# Patient Record
Sex: FEMALE | Race: WHITE | NOT HISPANIC OR LATINO | Employment: FULL TIME | URBAN - METROPOLITAN AREA
[De-identification: names, ages, dates, MRNs, and addresses within clinical notes are randomized per-mention and may not be internally consistent; named-entity substitution may affect disease eponyms.]

---

## 2023-04-25 ENCOUNTER — ANESTHESIA (OUTPATIENT)
Dept: GASTROENTEROLOGY | Facility: HOSPITAL | Age: 48
End: 2023-04-25

## 2023-04-25 ENCOUNTER — HOSPITAL ENCOUNTER (OUTPATIENT)
Dept: GASTROENTEROLOGY | Facility: HOSPITAL | Age: 48
Setting detail: OUTPATIENT SURGERY
Discharge: HOME | End: 2023-04-25
Attending: STUDENT IN AN ORGANIZED HEALTH CARE EDUCATION/TRAINING PROGRAM | Admitting: STUDENT IN AN ORGANIZED HEALTH CARE EDUCATION/TRAINING PROGRAM

## 2023-04-25 ENCOUNTER — ANESTHESIA EVENT (OUTPATIENT)
Dept: GASTROENTEROLOGY | Facility: HOSPITAL | Age: 48
End: 2023-04-25

## 2023-04-25 VITALS
SYSTOLIC BLOOD PRESSURE: 100 MMHG | BODY MASS INDEX: 23.37 KG/M2 | HEIGHT: 62 IN | OXYGEN SATURATION: 99 % | WEIGHT: 127 LBS | DIASTOLIC BLOOD PRESSURE: 56 MMHG | TEMPERATURE: 98 F | HEART RATE: 92 BPM | RESPIRATION RATE: 16 BRPM

## 2023-04-25 DIAGNOSIS — Z12.11 SCREENING FOR COLON CANCER: ICD-10-CM

## 2023-04-25 RX ORDER — PROPOFOL 10 MG/ML
INJECTION, EMULSION INTRAVENOUS CONTINUOUS PRN
Status: DISCONTINUED | OUTPATIENT
Start: 2023-04-25 | End: 2023-04-25

## 2023-04-25 RX ORDER — PROPOFOL 10 MG/ML
INJECTION, EMULSION INTRAVENOUS AS NEEDED
Status: DISCONTINUED | OUTPATIENT
Start: 2023-04-25 | End: 2023-04-25

## 2023-04-25 RX ORDER — SODIUM CHLORIDE, SODIUM LACTATE, POTASSIUM CHLORIDE, CALCIUM CHLORIDE 600; 310; 30; 20 MG/100ML; MG/100ML; MG/100ML; MG/100ML
125 INJECTION, SOLUTION INTRAVENOUS CONTINUOUS
Status: DISCONTINUED | OUTPATIENT
Start: 2023-04-25 | End: 2023-04-29 | Stop reason: HOSPADM

## 2023-04-25 RX ORDER — LIDOCAINE HYDROCHLORIDE 20 MG/ML
INJECTION, SOLUTION EPIDURAL; INFILTRATION; INTRACAUDAL; PERINEURAL AS NEEDED
Status: DISCONTINUED | OUTPATIENT
Start: 2023-04-25 | End: 2023-04-25

## 2023-04-25 RX ADMIN — PROPOFOL 30 MG: 10 INJECTION, EMULSION INTRAVENOUS at 12:55

## 2023-04-25 RX ADMIN — PROPOFOL 70 MG: 10 INJECTION, EMULSION INTRAVENOUS at 12:51

## 2023-04-25 RX ADMIN — PROPOFOL 30 MG: 10 INJECTION, EMULSION INTRAVENOUS at 12:52

## 2023-04-25 RX ADMIN — LIDOCAINE HYDROCHLORIDE 50 MG: 20 INJECTION, SOLUTION EPIDURAL; INFILTRATION; INTRACAUDAL; PERINEURAL at 12:51

## 2023-04-25 RX ADMIN — PROPOFOL 130 MCG/KG/MIN: 10 INJECTION, EMULSION INTRAVENOUS at 12:51

## 2023-04-25 RX ADMIN — PROPOFOL 30 MG: 10 INJECTION, EMULSION INTRAVENOUS at 12:58

## 2023-04-25 RX ADMIN — SODIUM CHLORIDE, SODIUM LACTATE, POTASSIUM CHLORIDE, AND CALCIUM CHLORIDE 125 ML/HR: .6; .31; .03; .02 INJECTION, SOLUTION INTRAVENOUS at 11:28

## 2023-04-25 NOTE — H&P
"History and Physical -  [facility]  Jean-Claude Rausch 41 y o  female MRN: [id number]                  HPI: Jean-Claude Rausch is a 41y o  year old female who presents for colon cancer screening      REVIEW OF SYSTEMS: Per the HPI, and otherwise unremarkable  Historical Information   Past Medical History:   Diagnosis Date   • Allergic    • Cancer ([address] ) 2018    breast masectomy     Past Surgical History:   Procedure Laterality Date   • BREAST SURGERY  2018   • HYSTERECTOMY       Social History   Social History     Substance and Sexual Activity   Alcohol Use Not Currently    Comment: occasional     Social History     Substance and Sexual Activity   Drug Use Never     Social History     Tobacco Use   Smoking Status Never   Smokeless Tobacco Never     Family History   Problem Relation Age of Onset   • No Known Problems Mother    • No Known Problems Father    • No Known Problems Brother        Meds/Allergies       Current Outpatient Medications:   •  cetirizine (ZyrTEC) 10 mg tablet  •  cholecalciferol (VITAMIN D3) 1,000 units tablet  •  lisinopril (ZESTRIL) 5 mg tablet  •  Multiple Vitamin (multivitamin) tablet  •  tamoxifen (NOLVADEX) 20 mg tablet  •  Omega-3 Fatty Acids (fish oil) 1,000 mg  •  polyethylene glycol (GOLYTELY) 4000 mL solution    Current Facility-Administered Medications:   •  lactated ringers infusion, 125 mL/hr, Intravenous, Continuous, Continue from Pre-op at 04/25/23 1210    No Known Allergies    Objective     /76   Pulse 95   Temp 98 9 °F (37 2 °C) (Temporal)   Resp 18   Ht 5' 2\" (1 575 m)   Wt 57 6 kg (127 lb)   SpO2 99%   BMI 23 23 kg/m²       PHYSICAL EXAM    Gen: NAD  Head: NCAT  CV: RRR  CHEST: Clear  ABD: soft, NT/ND  EXT: no edema      ASSESSMENT/PLAN:  This is a 41y o  year old female here for colonoscopy, and she is stable and optimized for her procedure          "

## 2023-04-25 NOTE — ANESTHESIA PREPROCEDURE EVALUATION
Procedure:  COLONOSCOPY    Relevant Problems   CARDIO   (+) Hypertension   (+) Mixed hyperlipidemia      GYN   (+) Breast cancer (HCC)   (+) S/P abdominal hysterectomy and left salpingo-oophorectomy        Physical Exam    Airway    Mallampati score: I  TM Distance: >3 FB  Neck ROM: full     Dental   No notable dental hx     Cardiovascular      Pulmonary      Other Findings        Anesthesia Plan  ASA Score- 2     Anesthesia Type- IV sedation with anesthesia with ASA Monitors  Additional Monitors:   Airway Plan:           Plan Factors-    Chart reviewed  Patient summary reviewed  Induction- intravenous  Postoperative Plan-     Informed Consent- Anesthetic plan and risks discussed with patient  I personally reviewed this patient with the CRNA  Discussed and agreed on the Anesthesia Plan with the CRNA  Dania Franklin

## 2023-04-25 NOTE — ANESTHESIA POSTPROCEDURE EVALUATION
Post-Op Assessment Note    CV Status:  Stable  Pain Score: 0    Pain management: adequate     Mental Status:  Arousable   Hydration Status:  Stable   PONV Controlled:  None   Airway Patency:  Patent      Post Op Vitals Reviewed: Yes      Staff: CRNA         No notable events documented      BP   94/52   Temp      Pulse  89   Resp 16   SpO2   98

## 2023-08-05 ENCOUNTER — APPOINTMENT (OUTPATIENT)
Dept: LAB | Facility: CLINIC | Age: 48
End: 2023-08-05
Payer: COMMERCIAL

## 2023-08-05 DIAGNOSIS — R76.8 FALSE POSITIVE SEROLOGICAL TEST FOR SYPHILIS: ICD-10-CM

## 2023-08-05 DIAGNOSIS — D70.8 OTHER NEUTROPENIA (HCC): ICD-10-CM

## 2023-08-05 DIAGNOSIS — D72.819 LEUKOPENIA, UNSPECIFIED TYPE: ICD-10-CM

## 2023-08-05 DIAGNOSIS — R74.8 ACID PHOSPHATASE ELEVATED: ICD-10-CM

## 2023-08-05 DIAGNOSIS — R63.5 ABNORMAL WEIGHT GAIN: ICD-10-CM

## 2023-08-05 DIAGNOSIS — E78.2 MIXED HYPERLIPIDEMIA: ICD-10-CM

## 2023-08-05 DIAGNOSIS — M35.00 SICCA SYNDROME (HCC): ICD-10-CM

## 2023-08-05 LAB
C3 SERPL-MCNC: 117 MG/DL (ref 90–180)
C4 SERPL-MCNC: 25 MG/DL (ref 10–40)
T4 FREE SERPL-MCNC: 0.74 NG/DL (ref 0.61–1.12)
TSH SERPL DL<=0.05 MIU/L-ACNC: 1.78 UIU/ML (ref 0.45–4.5)

## 2023-08-05 PROCEDURE — 86376 MICROSOMAL ANTIBODY EACH: CPT

## 2023-08-05 PROCEDURE — 36415 COLL VENOUS BLD VENIPUNCTURE: CPT

## 2023-08-05 PROCEDURE — 84443 ASSAY THYROID STIM HORMONE: CPT

## 2023-08-05 PROCEDURE — 86235 NUCLEAR ANTIGEN ANTIBODY: CPT

## 2023-08-05 PROCEDURE — 80074 ACUTE HEPATITIS PANEL: CPT

## 2023-08-05 PROCEDURE — 86160 COMPLEMENT ANTIGEN: CPT

## 2023-08-05 PROCEDURE — 86800 THYROGLOBULIN ANTIBODY: CPT

## 2023-08-05 PROCEDURE — 84439 ASSAY OF FREE THYROXINE: CPT

## 2023-08-05 PROCEDURE — 86225 DNA ANTIBODY NATIVE: CPT

## 2023-08-06 LAB
HAV IGM SER QL: NORMAL
HBV CORE IGM SER QL: NORMAL
HBV SURFACE AG SER QL: NORMAL
HCV AB SER QL: NORMAL

## 2023-08-07 LAB
DSDNA AB SER-ACNC: <1 IU/ML (ref 0–9)
ENA SS-A AB SER-ACNC: <0.2 AI (ref 0–0.9)
ENA SS-B AB SER-ACNC: <0.2 AI (ref 0–0.9)
THYROPEROXIDASE AB SERPL-ACNC: <9 IU/ML (ref 0–34)

## 2023-08-08 LAB — THYROGLOB AB SERPL-ACNC: <1 IU/ML (ref 0–0.9)

## 2023-08-10 PROCEDURE — 36415 COLL VENOUS BLD VENIPUNCTURE: CPT

## 2023-08-17 LAB — MISCELLANEOUS LAB TEST RESULT: NORMAL

## 2023-09-07 ENCOUNTER — TELEPHONE (OUTPATIENT)
Dept: INTERNAL MEDICINE CLINIC | Facility: OTHER | Age: 48
End: 2023-09-07

## 2023-09-07 NOTE — TELEPHONE ENCOUNTER
Called patient to schedule an appointment and she stated that the bloodwork in her chart from 8/5 was ordered by a Dr. Mohan Paz, a rheumatologist at [facility] in [facility]. She stated that she has an appointment on 9/19/23 with them to review all the results. Do you still need me to schedule and appointment with you and if so she would like to know what the appointment would be for?

## 2023-09-07 NOTE — TELEPHONE ENCOUNTER
----- Message from [address] sent at 9/1/2023  9:41 AM EDT -----  Please call to schedule follow up to discuss blood work results.    ----- Message -----  From: Lab, Background User  Sent: 8/30/2023   4:40 PM EDT  To: CHINTAN Vaughan

## 2023-09-08 NOTE — TELEPHONE ENCOUNTER
Her urine results were abnormal and her doctor had forwarded the results to me to discuss, she can wait till after her follow up with her rheumatologist if she would like.

## 2023-10-09 DIAGNOSIS — I10 HYPERTENSION, UNSPECIFIED TYPE: ICD-10-CM

## 2023-10-09 RX ORDER — LISINOPRIL 5 MG/1
5 TABLET ORAL DAILY
Qty: 90 TABLET | Refills: 1 | Status: SHIPPED | OUTPATIENT
Start: 2023-10-09

## 2024-01-03 ENCOUNTER — TELEPHONE (OUTPATIENT)
Age: 49
End: 2024-01-03

## 2024-01-03 DIAGNOSIS — E55.9 VITAMIN D DEFICIENCY: Primary | ICD-10-CM

## 2024-01-05 DIAGNOSIS — I10 HYPERTENSION, UNSPECIFIED TYPE: ICD-10-CM

## 2024-01-05 DIAGNOSIS — E78.2 MIXED HYPERLIPIDEMIA: Primary | ICD-10-CM

## 2024-01-27 ENCOUNTER — APPOINTMENT (OUTPATIENT)
Dept: LAB | Facility: CLINIC | Age: 49
End: 2024-01-27
Payer: COMMERCIAL

## 2024-01-27 DIAGNOSIS — E78.2 MIXED HYPERLIPIDEMIA: ICD-10-CM

## 2024-01-27 DIAGNOSIS — I10 HYPERTENSION, UNSPECIFIED TYPE: ICD-10-CM

## 2024-01-27 LAB
25(OH)D3 SERPL-MCNC: 32 NG/ML (ref 30–100)
ALBUMIN SERPL BCP-MCNC: 3.8 G/DL (ref 3.5–5)
ALP SERPL-CCNC: 31 U/L (ref 34–104)
ALT SERPL W P-5'-P-CCNC: 18 U/L (ref 7–52)
ANION GAP SERPL CALCULATED.3IONS-SCNC: 9 MMOL/L
AST SERPL W P-5'-P-CCNC: 20 U/L (ref 13–39)
BILIRUB SERPL-MCNC: 0.2 MG/DL (ref 0.2–1)
BUN SERPL-MCNC: 15 MG/DL (ref 5–25)
CALCIUM SERPL-MCNC: 9.2 MG/DL (ref 8.4–10.2)
CHLORIDE SERPL-SCNC: 104 MMOL/L (ref 96–108)
CO2 SERPL-SCNC: 26 MMOL/L (ref 21–32)
CREAT SERPL-MCNC: 0.31 MG/DL (ref 0.6–1.3)
GFR SERPL CREATININE-BSD FRML MDRD: 136 ML/MIN/1.73SQ M
GLUCOSE P FAST SERPL-MCNC: 87 MG/DL (ref 65–99)
POTASSIUM SERPL-SCNC: 4.3 MMOL/L (ref 3.5–5.3)
PROT SERPL-MCNC: 6.1 G/DL (ref 6.4–8.4)
SODIUM SERPL-SCNC: 139 MMOL/L (ref 135–147)

## 2024-01-29 ENCOUNTER — OFFICE VISIT (OUTPATIENT)
Age: 49
End: 2024-01-29
Payer: COMMERCIAL

## 2024-01-29 VITALS
TEMPERATURE: 98.4 F | HEART RATE: 74 BPM | SYSTOLIC BLOOD PRESSURE: 124 MMHG | WEIGHT: 153 LBS | HEIGHT: 62 IN | BODY MASS INDEX: 28.16 KG/M2 | OXYGEN SATURATION: 99 % | DIASTOLIC BLOOD PRESSURE: 80 MMHG

## 2024-01-29 DIAGNOSIS — D64.9 LOW HEMOGLOBIN: Primary | ICD-10-CM

## 2024-01-29 DIAGNOSIS — E55.9 VITAMIN D DEFICIENCY: ICD-10-CM

## 2024-01-29 DIAGNOSIS — I10 HYPERTENSION, UNSPECIFIED TYPE: ICD-10-CM

## 2024-01-29 DIAGNOSIS — Z23 ENCOUNTER FOR IMMUNIZATION: ICD-10-CM

## 2024-01-29 DIAGNOSIS — M35.00 SICCA COMPLEX (HCC): ICD-10-CM

## 2024-01-29 DIAGNOSIS — N87.9 CERVICAL DYSPLASIA: ICD-10-CM

## 2024-01-29 DIAGNOSIS — C50.912 MALIGNANT NEOPLASM OF LEFT FEMALE BREAST, UNSPECIFIED ESTROGEN RECEPTOR STATUS, UNSPECIFIED SITE OF BREAST (HCC): ICD-10-CM

## 2024-01-29 DIAGNOSIS — D70.8 OTHER NEUTROPENIA (HCC): ICD-10-CM

## 2024-01-29 DIAGNOSIS — E78.2 MIXED HYPERLIPIDEMIA: ICD-10-CM

## 2024-01-29 PROCEDURE — 90715 TDAP VACCINE 7 YRS/> IM: CPT

## 2024-01-29 PROCEDURE — 99214 OFFICE O/P EST MOD 30 MIN: CPT | Performed by: NURSE PRACTITIONER

## 2024-01-29 PROCEDURE — 90471 IMMUNIZATION ADMIN: CPT

## 2024-01-29 RX ORDER — LISINOPRIL 5 MG/1
5 TABLET ORAL DAILY
Qty: 90 TABLET | Refills: 1 | Status: SHIPPED | OUTPATIENT
Start: 2024-01-29

## 2024-01-29 NOTE — ASSESSMENT & PLAN NOTE
Well controlled on lisinopril. Continue current regimen -   Continue to monitor blood pressure at home.  Goal BP is < 130/80.  Contact our office for consistent elevations.  Recommend low sodium diet.  Exercise 30 minutes 5 times a week as tolerated.  Recommend yearly eye exam.

## 2024-01-29 NOTE — PROGRESS NOTES
Assessment/Plan:    Hypertension  Well controlled on lisinopril. Continue current regimen -   Continue to monitor blood pressure at home.  Goal BP is < 130/80.  Contact our office for consistent elevations.  Recommend low sodium diet.  Exercise 30 minutes 5 times a week as tolerated.  Recommend yearly eye exam.       Cervical dysplasia  Status post hysterectomy    Vitamin D deficiency  Continue with supplementation.     Other neutropenia (HCC)  Continue to monitor CBC   Continue to follow with hematology     Mixed hyperlipidemia  Currently taking fish oil. Recommend healthy lifestyle choices for your cholesterol.  Low fat/low cholesterol diet.  Limit/avoid red meat.  Eat more lean meat - chicken breast, ground turkey, fish.  Exercise 30 mins at least 5 times a week as tolerated.        Breast cancer (HCC)  Currently taking Tamoxifen, status post bilateral mastectomy    Low hemoglobin  -repeat CBC panel   -get iron and B12 levels              Diagnoses and all orders for this visit:    Low hemoglobin  -     CBC and differential  -     Iron Panel (Includes Ferritin, Iron Sat%, Iron, and TIBC); Future  -     Vitamin B12; Future    Hypertension, unspecified type  -     lisinopril (ZESTRIL) 5 mg tablet; Take 1 tablet (5 mg total) by mouth daily  -     Comprehensive metabolic panel; Future  -     CBC and differential; Future  -     Lipid panel; Future    Sicca complex (HCC)    Malignant neoplasm of left female breast, unspecified estrogen receptor status, unspecified site of breast (HCC)    Vitamin D deficiency  -     Vitamin D 25 hydroxy; Future    Cervical dysplasia    Other neutropenia (HCC)    Mixed hyperlipidemia          Subjective:      Patient ID: Rosalba Vazquez is a 48 y.o. female.    Patient presents today for annual follow-up.  Reviewed blood work while in office today.    Has gained weight, but has not been eating as well     HTN-well controlled on lisinopril, denies side effects with this medication      Cervical dysplasia-history of abdominal hysterectomy and left salpingo-oophorectomy       Breast CA- history of bilateral mastectomy, taking tamoxifen    Mixed hyperlipidemia- currently taking fish oil, well controlled, cholesterol 201 LDL 81    Other neutropenia- chronic for patient, patient reports that in the past she has been noted to have low white blood cell count, and has seen Hematology in the past    Denies any new concerns.      Hbg 10.9  Has had blood in her stool, once every couple of months, colonoscopy 2023     Saw Rheumatology   +YORDAN, dry eyes with negative Sjogren's antibodies and mild leukopenia:  All other labs negative / normal  - no evidence to support diagnosis of systemic autoimmune condition     She reports some seasonal depression- she works from home  and dose not get out much, she defers medication, also offered talk therapy             The following portions of the patient's history were reviewed and updated as appropriate: allergies, current medications, past family history, past medical history, past social history, past surgical history, and problem list.    Review of Systems   Constitutional:  Negative for activity change, appetite change, chills, diaphoresis and fever.   HENT:  Negative for congestion, ear discharge, ear pain, postnasal drip, rhinorrhea, sinus pressure, sinus pain and sore throat.    Eyes:  Negative for pain, discharge, itching and visual disturbance.   Respiratory:  Negative for cough, chest tightness, shortness of breath and wheezing.    Cardiovascular:  Negative for chest pain, palpitations and leg swelling.   Gastrointestinal:  Negative for abdominal pain, constipation, diarrhea, nausea and vomiting.   Endocrine: Negative for polydipsia, polyphagia and polyuria.   Genitourinary:  Negative for difficulty urinating, dysuria and urgency.   Musculoskeletal:  Negative for arthralgias, back pain and neck pain.   Skin:  Negative for rash and wound.   Neurological:   "Negative for dizziness, weakness, numbness and headaches.   Psychiatric/Behavioral:  Positive for dysphoric mood.          Past Medical History:   Diagnosis Date    Allergic     Cancer (HCC) 2018    breast masectomy         Current Outpatient Medications:     cetirizine (ZyrTEC) 10 mg tablet, Take 10 mg by mouth daily, Disp: , Rfl:     cholecalciferol (VITAMIN D3) 1,000 units tablet, Take 1,000 Units by mouth daily, Disp: , Rfl:     lisinopril (ZESTRIL) 5 mg tablet, Take 1 tablet (5 mg total) by mouth daily, Disp: 90 tablet, Rfl: 1    Multiple Vitamin (multivitamin) tablet, Take 1 tablet by mouth daily, Disp: , Rfl:     Omega-3 Fatty Acids (fish oil) 1,000 mg, Take 1,000 mg by mouth daily, Disp: , Rfl:     tamoxifen (NOLVADEX) 20 mg tablet, Take 20 mg by mouth daily, Disp: , Rfl:     No Known Allergies    Social History   Past Surgical History:   Procedure Laterality Date    BREAST SURGERY  2018    HYSTERECTOMY       Family History   Problem Relation Age of Onset    No Known Problems Mother     No Known Problems Father     No Known Problems Brother        Objective:  /80 (BP Location: Right arm, Patient Position: Sitting, Cuff Size: Standard)   Pulse 74   Temp 98.4 °F (36.9 °C) (Temporal)   Ht 5' 2.05\" (1.576 m)   Wt 69.4 kg (153 lb)   SpO2 99%   BMI 27.94 kg/m²     Recent Results (from the past 1344 hour(s))   CBC and differential    Collection Time: 01/27/24  8:00 AM   Result Value Ref Range    WBC 4.35 4.31 - 10.16 Thousand/uL    RBC 3.42 (L) 3.81 - 5.12 Million/uL    Hemoglobin 10.9 (L) 11.5 - 15.4 g/dL    Hematocrit 33.7 (L) 34.8 - 46.1 %    MCV 99 (H) 82 - 98 fL    MCH 31.9 26.8 - 34.3 pg    MCHC 32.3 31.4 - 37.4 g/dL    RDW 12.5 11.6 - 15.1 %    MPV 8.6 (L) 8.9 - 12.7 fL    Platelets 250 149 - 390 Thousands/uL    nRBC 0 /100 WBCs    Neutrophils Relative 46 43 - 75 %    Immat GRANS % 0 0 - 2 %    Lymphocytes Relative 36 14 - 44 %    Monocytes Relative 15 (H) 4 - 12 %    Eosinophils Relative 2 0 - 6 " %    Basophils Relative 1 0 - 1 %    Neutrophils Absolute 2.00 1.85 - 7.62 Thousands/µL    Immature Grans Absolute 0.01 0.00 - 0.20 Thousand/uL    Lymphocytes Absolute 1.58 0.60 - 4.47 Thousands/µL    Monocytes Absolute 0.66 0.17 - 1.22 Thousand/µL    Eosinophils Absolute 0.08 0.00 - 0.61 Thousand/µL    Basophils Absolute 0.02 0.00 - 0.10 Thousands/µL   Lipid panel    Collection Time: 01/27/24  8:00 AM   Result Value Ref Range    Cholesterol 201 (H) See Comment mg/dL    Triglycerides 83 See Comment mg/dL    HDL, Direct 103 >=50 mg/dL    LDL Calculated 81 0 - 100 mg/dL    Non-HDL-Chol (CHOL-HDL) 98 mg/dl   Comprehensive metabolic panel    Collection Time: 01/27/24  8:00 AM   Result Value Ref Range    Sodium 139 135 - 147 mmol/L    Potassium 4.3 3.5 - 5.3 mmol/L    Chloride 104 96 - 108 mmol/L    CO2 26 21 - 32 mmol/L    ANION GAP 9 mmol/L    BUN 15 5 - 25 mg/dL    Creatinine 0.31 (L) 0.60 - 1.30 mg/dL    Glucose, Fasting 87 65 - 99 mg/dL    Calcium 9.2 8.4 - 10.2 mg/dL    AST 20 13 - 39 U/L    ALT 18 7 - 52 U/L    Alkaline Phosphatase 31 (L) 34 - 104 U/L    Total Protein 6.1 (L) 6.4 - 8.4 g/dL    Albumin 3.8 3.5 - 5.0 g/dL    Total Bilirubin 0.20 0.20 - 1.00 mg/dL    eGFR 136 ml/min/1.73sq m   Vitamin D 25 hydroxy    Collection Time: 01/27/24  8:00 AM   Result Value Ref Range    Vit D, 25-Hydroxy 32.0 30.0 - 100.0 ng/mL            Physical Exam  Constitutional:       General: She is not in acute distress.     Appearance: She is well-developed. She is not diaphoretic.   HENT:      Head: Normocephalic and atraumatic.      Right Ear: External ear normal.      Left Ear: External ear normal.      Nose: Nose normal.      Mouth/Throat:      Mouth: Mucous membranes are moist.      Pharynx: No oropharyngeal exudate or posterior oropharyngeal erythema.   Eyes:      General:         Right eye: No discharge.         Left eye: No discharge.      Conjunctiva/sclera: Conjunctivae normal.      Pupils: Pupils are equal, round, and  reactive to light.   Neck:      Thyroid: No thyromegaly.   Cardiovascular:      Rate and Rhythm: Normal rate and regular rhythm.      Heart sounds: Normal heart sounds. No murmur heard.     No friction rub. No gallop.   Pulmonary:      Effort: Pulmonary effort is normal. No respiratory distress.      Breath sounds: Normal breath sounds. No stridor. No wheezing or rales.   Abdominal:      General: Bowel sounds are normal. There is no distension.      Palpations: Abdomen is soft.      Tenderness: There is no abdominal tenderness.   Musculoskeletal:      Cervical back: Normal range of motion and neck supple.   Lymphadenopathy:      Cervical: No cervical adenopathy.   Skin:     General: Skin is warm and dry.      Findings: No erythema or rash.   Neurological:      Mental Status: She is alert and oriented to person, place, and time.   Psychiatric:         Behavior: Behavior normal.         Thought Content: Thought content normal.         Judgment: Judgment normal.

## 2024-01-29 NOTE — ASSESSMENT & PLAN NOTE
Currently taking fish oil. Recommend healthy lifestyle choices for your cholesterol.  Low fat/low cholesterol diet.  Limit/avoid red meat.  Eat more lean meat - chicken breast, ground turkey, fish.  Exercise 30 mins at least 5 times a week as tolerated.

## 2024-05-13 ENCOUNTER — HOSPITAL ENCOUNTER (EMERGENCY)
Facility: HOSPITAL | Age: 49
Discharge: HOME | End: 2024-05-13
Attending: EMERGENCY MEDICINE
Payer: COMMERCIAL

## 2024-05-13 ENCOUNTER — APPOINTMENT (EMERGENCY)
Dept: CT IMAGING | Facility: HOSPITAL | Age: 49
End: 2024-05-13
Payer: COMMERCIAL

## 2024-05-13 ENCOUNTER — APPOINTMENT (EMERGENCY)
Dept: ULTRASOUND IMAGING | Facility: HOSPITAL | Age: 49
End: 2024-05-13
Payer: COMMERCIAL

## 2024-05-13 VITALS
HEIGHT: 62 IN | SYSTOLIC BLOOD PRESSURE: 131 MMHG | HEART RATE: 100 BPM | BODY MASS INDEX: 28.16 KG/M2 | DIASTOLIC BLOOD PRESSURE: 71 MMHG | RESPIRATION RATE: 18 BRPM | WEIGHT: 153 LBS | OXYGEN SATURATION: 99 %

## 2024-05-13 DIAGNOSIS — N83.201 CYST OF RIGHT OVARY: Primary | ICD-10-CM

## 2024-05-13 LAB
ALBUMIN SERPL BCP-MCNC: 4.2 G/DL (ref 3.5–5)
ALP SERPL-CCNC: 22 U/L (ref 34–104)
ALT SERPL W P-5'-P-CCNC: 20 U/L (ref 7–52)
ANION GAP SERPL CALCULATED.3IONS-SCNC: 7 MMOL/L (ref 4–13)
APTT PPP: 24 SECONDS (ref 23–37)
AST SERPL W P-5'-P-CCNC: 26 U/L (ref 13–39)
BASOPHILS # BLD AUTO: 0.04 THOUSANDS/ÂΜL (ref 0–0.1)
BASOPHILS NFR BLD AUTO: 1 % (ref 0–1)
BILIRUB SERPL-MCNC: 0.36 MG/DL (ref 0.2–1)
BILIRUB UR QL STRIP: NEGATIVE
BUN SERPL-MCNC: 13 MG/DL (ref 5–25)
CALCIUM SERPL-MCNC: 9.3 MG/DL (ref 8.4–10.2)
CHLORIDE SERPL-SCNC: 107 MMOL/L (ref 96–108)
CLARITY UR: CLEAR
CO2 SERPL-SCNC: 24 MMOL/L (ref 21–32)
COLOR UR: YELLOW
CREAT SERPL-MCNC: 0.41 MG/DL (ref 0.6–1.3)
EOSINOPHIL # BLD AUTO: 0.03 THOUSAND/ÂΜL (ref 0–0.61)
EOSINOPHIL NFR BLD AUTO: 1 % (ref 0–6)
ERYTHROCYTE [DISTWIDTH] IN BLOOD BY AUTOMATED COUNT: 12.7 % (ref 11.6–15.1)
GFR SERPL CREATININE-BSD FRML MDRD: 124 ML/MIN/1.73SQ M
GLUCOSE SERPL-MCNC: 115 MG/DL (ref 65–140)
GLUCOSE UR STRIP-MCNC: NEGATIVE MG/DL
HCT VFR BLD AUTO: 37.8 % (ref 34.8–46.1)
HGB BLD-MCNC: 12.7 G/DL (ref 11.5–15.4)
HGB UR QL STRIP.AUTO: NEGATIVE
IMM GRANULOCYTES # BLD AUTO: 0.05 THOUSAND/UL (ref 0–0.2)
IMM GRANULOCYTES NFR BLD AUTO: 1 % (ref 0–2)
INR PPP: 0.9 (ref 0.84–1.19)
KETONES UR STRIP-MCNC: NEGATIVE MG/DL
LEUKOCYTE ESTERASE UR QL STRIP: NEGATIVE
LYMPHOCYTES # BLD AUTO: 0.97 THOUSANDS/ÂΜL (ref 0.6–4.47)
LYMPHOCYTES NFR BLD AUTO: 16 % (ref 14–44)
MCH RBC QN AUTO: 32.7 PG (ref 26.8–34.3)
MCHC RBC AUTO-ENTMCNC: 33.6 G/DL (ref 31.4–37.4)
MCV RBC AUTO: 97 FL (ref 82–98)
MONOCYTES # BLD AUTO: 0.42 THOUSAND/ÂΜL (ref 0.17–1.22)
MONOCYTES NFR BLD AUTO: 7 % (ref 4–12)
NEUTROPHILS # BLD AUTO: 4.63 THOUSANDS/ÂΜL (ref 1.85–7.62)
NEUTS SEG NFR BLD AUTO: 74 % (ref 43–75)
NITRITE UR QL STRIP: NEGATIVE
NRBC BLD AUTO-RTO: 0 /100 WBCS
PH UR STRIP.AUTO: 6 [PH]
PLATELET # BLD AUTO: 266 THOUSANDS/UL (ref 149–390)
PMV BLD AUTO: 8.8 FL (ref 8.9–12.7)
POTASSIUM SERPL-SCNC: 4.7 MMOL/L (ref 3.5–5.3)
PROT SERPL-MCNC: 7 G/DL (ref 6.4–8.4)
PROT UR STRIP-MCNC: NEGATIVE MG/DL
PROTHROMBIN TIME: 12.3 SECONDS (ref 11.6–14.5)
RBC # BLD AUTO: 3.88 MILLION/UL (ref 3.81–5.12)
SODIUM SERPL-SCNC: 138 MMOL/L (ref 135–147)
SP GR UR STRIP.AUTO: >=1.03
UROBILINOGEN UR QL STRIP.AUTO: 0.2 E.U./DL
WBC # BLD AUTO: 6.14 THOUSAND/UL (ref 4.31–10.16)

## 2024-05-13 PROCEDURE — 85025 COMPLETE CBC W/AUTO DIFF WBC: CPT | Performed by: EMERGENCY MEDICINE

## 2024-05-13 PROCEDURE — 36415 COLL VENOUS BLD VENIPUNCTURE: CPT | Performed by: EMERGENCY MEDICINE

## 2024-05-13 PROCEDURE — 74177 CT ABD & PELVIS W/CONTRAST: CPT

## 2024-05-13 PROCEDURE — 96360 HYDRATION IV INFUSION INIT: CPT

## 2024-05-13 PROCEDURE — 99283 EMERGENCY DEPT VISIT LOW MDM: CPT

## 2024-05-13 PROCEDURE — 81003 URINALYSIS AUTO W/O SCOPE: CPT | Performed by: EMERGENCY MEDICINE

## 2024-05-13 PROCEDURE — 76856 US EXAM PELVIC COMPLETE: CPT

## 2024-05-13 PROCEDURE — 85610 PROTHROMBIN TIME: CPT | Performed by: EMERGENCY MEDICINE

## 2024-05-13 PROCEDURE — 99285 EMERGENCY DEPT VISIT HI MDM: CPT | Performed by: EMERGENCY MEDICINE

## 2024-05-13 PROCEDURE — 80053 COMPREHEN METABOLIC PANEL: CPT | Performed by: EMERGENCY MEDICINE

## 2024-05-13 PROCEDURE — 85730 THROMBOPLASTIN TIME PARTIAL: CPT | Performed by: EMERGENCY MEDICINE

## 2024-05-13 RX ADMIN — SODIUM CHLORIDE 1000 ML: 0.9 INJECTION, SOLUTION INTRAVENOUS at 10:57

## 2024-05-13 RX ADMIN — IOHEXOL 100 ML: 350 INJECTION, SOLUTION INTRAVENOUS at 11:49

## 2024-05-13 NOTE — DISCHARGE INSTRUCTIONS
It is important that you follow-up with your gynecologist oncologist in regards to the following ultrasound findings:    1.  No sonographic evidence of current ovarian torsion.  2.  Unilocular right ovarian cystic lesion with a solid component. Category O-RADS 4. Imaging recommendations include MRI (with MRI O-RADS) or GYN-oncologist protocol. Clinical management by gynecologist with GYN-oncologist consultation or solely by   GYN-oncologist.     It is important they return to the emergency department if your symptoms acutely worsen as this could be signs of an ovarian torsion you are more at risk for given the cystic mass

## 2024-05-13 NOTE — ED PROVIDER NOTES
History  Chief Complaint   Patient presents with    Flank Pain     Right flank pain since 7am today. No history of stones. Points to RLQ but mentions lower back pain as well.     Patient is a 48-year-old female presents for evaluation of right lower quadrant/right lower back pain.  Patient says that the symptoms started around 7 AM acutely.  She says she took 2 Advil which helped with the discomfort.  She says that the pain is in the right lower quadrant mostly right lower back.  Denies any urinary symptoms.  Denies any fevers or chills.  Does admit to nausea but no vomiting.  Does have a history of an ovarian cyst on that right side.  Has history of a hysterectomy and left cell Pingel oophorectomy.  He says that pain is worse with movement and standing.        Prior to Admission Medications   Prescriptions Last Dose Informant Patient Reported? Taking?   Multiple Vitamin (multivitamin) tablet  Self Yes No   Sig: Take 1 tablet by mouth daily   Omega-3 Fatty Acids (fish oil) 1,000 mg  Self Yes No   Sig: Take 1,000 mg by mouth daily   cetirizine (ZyrTEC) 10 mg tablet  Self Yes No   Sig: Take 10 mg by mouth daily   cholecalciferol (VITAMIN D3) 1,000 units tablet  Self Yes No   Sig: Take 1,000 Units by mouth daily   lisinopril (ZESTRIL) 5 mg tablet   No No   Sig: Take 1 tablet (5 mg total) by mouth daily   tamoxifen (NOLVADEX) 20 mg tablet  Self Yes No   Sig: Take 20 mg by mouth daily      Facility-Administered Medications: None       Past Medical History:   Diagnosis Date    Allergic     Cancer (HCC) 2018    breast masectomy       Past Surgical History:   Procedure Laterality Date    BREAST SURGERY  2018    HYSTERECTOMY         Family History   Problem Relation Age of Onset    No Known Problems Mother     No Known Problems Father     No Known Problems Brother      I have reviewed and agree with the history as documented.    E-Cigarette/Vaping    E-Cigarette Use Never User      E-Cigarette/Vaping Substances    Nicotine  No     THC No     CBD No     Flavoring No     Other No     Unknown No      Social History     Tobacco Use    Smoking status: Never    Smokeless tobacco: Never   Vaping Use    Vaping status: Never Used   Substance Use Topics    Alcohol use: Not Currently     Comment: occasional    Drug use: Never       Review of Systems   Constitutional:  Negative for chills, diaphoresis and fever.   HENT:  Negative for congestion, sinus pressure, sore throat and trouble swallowing.    Eyes:  Negative for pain, discharge and itching.   Respiratory:  Negative for cough, chest tightness, shortness of breath and wheezing.    Cardiovascular:  Negative for chest pain, palpitations and leg swelling.   Gastrointestinal:  Positive for abdominal pain (RLQ) and nausea. Negative for abdominal distention, blood in stool, diarrhea and vomiting.   Endocrine: Negative for polyphagia and polyuria.   Genitourinary:  Negative for difficulty urinating, dysuria, flank pain, hematuria, pelvic pain, vaginal bleeding, vaginal discharge and vaginal pain.   Musculoskeletal:  Negative for arthralgias and back pain.   Skin:  Negative for rash.   Neurological:  Negative for dizziness, syncope, weakness, light-headedness and headaches.       Physical Exam  Physical Exam  Vitals and nursing note reviewed.   Constitutional:       General: She is not in acute distress.     Appearance: She is well-developed.   HENT:      Head: Normocephalic and atraumatic.      Right Ear: External ear normal.      Left Ear: External ear normal.      Nose: Nose normal.      Mouth/Throat:      Mouth: Mucous membranes are moist.      Pharynx: No oropharyngeal exudate.   Eyes:      Conjunctiva/sclera: Conjunctivae normal.      Pupils: Pupils are equal, round, and reactive to light.   Cardiovascular:      Rate and Rhythm: Normal rate and regular rhythm.      Heart sounds: Normal heart sounds. No murmur heard.     No friction rub. No gallop.   Pulmonary:      Effort: Pulmonary effort is  normal. No respiratory distress.      Breath sounds: Normal breath sounds. No wheezing or rales.   Abdominal:      General: There is no distension.      Palpations: Abdomen is soft.      Tenderness: There is abdominal tenderness (RLQ). There is no guarding.   Musculoskeletal:         General: No swelling, tenderness or deformity. Normal range of motion.      Cervical back: Normal range of motion and neck supple.   Lymphadenopathy:      Cervical: No cervical adenopathy.   Skin:     General: Skin is warm and dry.   Neurological:      General: No focal deficit present.      Mental Status: She is alert and oriented to person, place, and time. Mental status is at baseline.      Cranial Nerves: No cranial nerve deficit.      Sensory: No sensory deficit.      Motor: No weakness or abnormal muscle tone.      Coordination: Coordination normal.         Vital Signs  ED Triage Vitals [05/13/24 1049]   Temp Pulse Respirations Blood Pressure SpO2   -- (!) 112 16 153/85 99 %      Temp src Heart Rate Source Patient Position - Orthostatic VS BP Location FiO2 (%)   -- Monitor Sitting Right arm --      Pain Score       6           Vitals:    05/13/24 1049 05/13/24 1506   BP: 153/85 131/71   Pulse: (!) 112 100   Patient Position - Orthostatic VS: Sitting Sitting         Visual Acuity      ED Medications  Medications   sodium chloride 0.9 % bolus 1,000 mL (0 mL Intravenous Stopped 5/13/24 1215)   iohexol (OMNIPAQUE) 350 MG/ML injection (MULTI-DOSE) 100 mL (100 mL Intravenous Given 5/13/24 1149)       Diagnostic Studies  Results Reviewed       Procedure Component Value Units Date/Time    Protime-INR [[id number]]  (Normal) Collected: 05/13/24 1056    Lab Status: Final result Specimen: Blood from Arm, Right Updated: 05/13/24 1128     Protime 12.3 seconds      INR 0.90    APTT [[id number]]  (Normal) Collected: 05/13/24 1056    Lab Status: Final result Specimen: Blood from Arm, Right Updated: 05/13/24 1128     PTT 24 seconds     Comprehensive  metabolic panel [[id number]]  (Abnormal) Collected: 05/13/24 1056    Lab Status: Final result Specimen: Blood from Arm, Right Updated: 05/13/24 1127     Sodium 138 mmol/L      Potassium 4.7 mmol/L      Chloride 107 mmol/L      CO2 24 mmol/L      ANION GAP 7 mmol/L      BUN 13 mg/dL      Creatinine 0.41 mg/dL      Glucose 115 mg/dL      Calcium 9.3 mg/dL      AST 26 U/L      ALT 20 U/L      Alkaline Phosphatase 22 U/L      Total Protein 7.0 g/dL      Albumin 4.2 g/dL      Total Bilirubin 0.36 mg/dL      eGFR 124 ml/min/1.73sq m     Narrative:      National Kidney Disease Foundation guidelines for Chronic Kidney Disease (CKD):     Stage 1 with normal or high GFR (GFR > 90 mL/min/1.73 square meters)    Stage 2 Mild CKD (GFR = 60-89 mL/min/1.73 square meters)    Stage 3A Moderate CKD (GFR = 45-59 mL/min/1.73 square meters)    Stage 3B Moderate CKD (GFR = 30-44 mL/min/1.73 square meters)    Stage 4 Severe CKD (GFR = 15-29 mL/min/1.73 square meters)    Stage 5 End Stage CKD (GFR <15 mL/min/1.73 square meters)  Note: GFR calculation is accurate only with a steady state creatinine    UA (URINE) with reflex to Scope [[id number]]  (Abnormal) Collected: 05/13/24 1104    Lab Status: Final result Specimen: Urine, Clean Catch Updated: 05/13/24 1117     Color, UA Yellow     Clarity, UA Clear     Specific Gravity, UA >=1.030     pH, UA 6.0     Leukocytes, UA Negative     Nitrite, UA Negative     Protein, UA Negative mg/dl      Glucose, UA Negative mg/dl      Ketones, UA Negative mg/dl      Urobilinogen, UA 0.2 E.U./dl      Bilirubin, UA Negative     Occult Blood, UA Negative    CBC and differential [[id number]]  (Abnormal) Collected: 05/13/24 1056    Lab Status: Final result Specimen: Blood from Arm, Right Updated: 05/13/24 1108     WBC 6.14 Thousand/uL      RBC 3.88 Million/uL      Hemoglobin 12.7 g/dL      Hematocrit 37.8 %      MCV 97 fL      MCH 32.7 pg      MCHC 33.6 g/dL      RDW 12.7 %      MPV 8.8 fL      Platelets 266  Thousands/uL      nRBC 0 /100 WBCs      Segmented % 74 %      Immature Grans % 1 %      Lymphocytes % 16 %      Monocytes % 7 %      Eosinophils Relative 1 %      Basophils Relative 1 %      Absolute Neutrophils 4.63 Thousands/µL      Absolute Immature Grans 0.05 Thousand/uL      Absolute Lymphocytes 0.97 Thousands/µL      Absolute Monocytes 0.42 Thousand/µL      Eosinophils Absolute 0.03 Thousand/µL      Basophils Absolute 0.04 Thousands/µL                    US pelvis complete non OB   Final Result by Cristiano Tijerina MD (05/13 7336)      1.  No sonographic evidence of current ovarian torsion.   2.  Unilocular right ovarian cystic lesion with a solid component. Category O-RADS 4. Imaging recommendations include MRI (with MRI O-RADS) or GYN-oncologist protocol. Clinical management by gynecologist with GYN-oncologist consultation or solely by    GYN-oncologist.      The study was marked in EPIC for immediate notification.         Workstation performed: [id number]         CT abdomen pelvis with contrast   Final Result by Randall Preciado MD (05/13 9248)      There is a left pelvic mass worrisome for right ovarian torsion. Would recommend a dedicated pelvic ultrasound for further evaluation purposes.      The study was marked in EPIC for immediate notification.         Workstation performed: [id number]                    Procedures  Procedures         ED Course  ED Course as of 05/14/24 2136   Mon May 13, 2024   1058 Patient had near total hysterectomy, urine pregnancy not needed   1312 Spoke with patient about the CT abdomen findings.  Patient says that her pain is on a 2 or 3 out of 10 much better than earlier this morning.  Will order a pelvic ultrasound.   1407 Spoke with the reading room to have the ultrasound read as soon as possible   1508 Patient symptoms improved.  Spoke with Rachel Holguin of gyn onc, who said that patient is okay for outpatient follow-up.  I spoke with the patient and updated  her on the results.  She says that she has an appoint with her gynecologist oncologist on Wednesday at [facility].  Given strict return precautions                                SBIRT 20yo+      Flowsheet Row Most Recent Value   Initial Alcohol Screen: US AUDIT-C     1. How often do you have a drink containing alcohol? 0 Filed at: 05/13/2024 1049   2. How many drinks containing alcohol do you have on a typical day you are drinking?  0 Filed at: 05/13/2024 1049   3b. FEMALE Any Age, or MALE 65+: How often do you have 4 or more drinks on one occassion? 0 Filed at: 05/13/2024 1049   Audit-C Score 0 Filed at: 05/13/2024 1049   PATRICE: How many times in the past year have you...    Used an illegal drug or used a prescription medication for non-medical reasons? Never Filed at: 05/13/2024 1049                      Medical Decision Making  48-year-old female presenting for evaluation of right lower quadrant abdominal pain this a.m.  History of right-sided ovarian cyst  Differential includes appendicitis, diverticulitis, ovarian cyst rupture, ovarian torsion  Will obtain CBC, CMP, UA.  Will obtain CT abdomen pelvis  CT abdomen pelvis shows concern for right pelvic mass and possible ovarian torsion.  Patient's symptoms have improved.  Will obtain stat ultrasound of the pelvis.    Ultrasound does not show any signs of torsion but does show a cystic mass with some solid component.  Spoke with Joi montaño who recommend outpatient follow-up.  Patient is going to follow-up with her Joi montaño on Wednesday.  See ED course for further details    Problems Addressed:  Cyst of right ovary: acute illness or injury    Amount and/or Complexity of Data Reviewed  Labs: ordered.  Radiology: ordered.    Risk  Prescription drug management.             Disposition  Final diagnoses:   Cyst of right ovary     Time reflects when diagnosis was documented in both MDM as applicable and the Disposition within this note       Time User Action Codes  Description Comment    5/13/2024  3:09 PM Nikhil Thakkar Add [N83.201] Cyst of right ovary           ED Disposition       ED Disposition   Discharge    Condition   Stable    Date/Time   Mon May 13, 2024 2189    Comment   Rosalba Vazquez discharge to home/self care.                   Follow-up Information       Follow up With Specialties Details Why Contact Info Additional Information    your gyn-oncologist  Schedule an appointment as soon as possible for a visit  For follow up of symptoms      [facility] Emergency Department Emergency Medicine Go to  If symptoms worsen [address] [facility] [address] [phone] [facility] Emergency Department, [address], [place], [place] [address]            Discharge Medication List as of 5/13/2024  3:11 PM        CONTINUE these medications which have NOT CHANGED    Details   cetirizine (ZyrTEC) 10 mg tablet Take 10 mg by mouth daily, Historical Med      cholecalciferol (VITAMIN D3) 1,000 units tablet Take 1,000 Units by mouth daily, Historical Med      lisinopril (ZESTRIL) 5 mg tablet Take 1 tablet (5 mg total) by mouth daily, Starting Mon 1/29/2024, Normal      Multiple Vitamin (multivitamin) tablet Take 1 tablet by mouth daily, Historical Med      Omega-3 Fatty Acids (fish oil) 1,000 mg Take 1,000 mg by mouth daily, Historical Med      tamoxifen (NOLVADEX) 20 mg tablet Take 20 mg by mouth daily, Starting Thu 2/3/2022, Historical Med             No discharge procedures on file.    PDMP Review       None            ED Provider  Electronically Signed by             Nikhil Thakkar DO  05/14/24 8769

## 2024-07-13 ENCOUNTER — APPOINTMENT (OUTPATIENT)
Dept: LAB | Facility: CLINIC | Age: 49
End: 2024-07-13
Payer: COMMERCIAL

## 2024-07-13 DIAGNOSIS — C50.512 MALIGNANT NEOPLASM OF LOWER-OUTER QUADRANT OF LEFT FEMALE BREAST, UNSPECIFIED ESTROGEN RECEPTOR STATUS (HCC): ICD-10-CM

## 2024-07-13 LAB
ALBUMIN SERPL BCG-MCNC: 4.3 G/DL (ref 3.5–5)
ALP SERPL-CCNC: 36 U/L (ref 34–104)
ALT SERPL W P-5'-P-CCNC: 22 U/L (ref 7–52)
ANION GAP SERPL CALCULATED.3IONS-SCNC: 8 MMOL/L (ref 4–13)
AST SERPL W P-5'-P-CCNC: 27 U/L (ref 13–39)
BASOPHILS # BLD AUTO: 0.02 THOUSANDS/ÂΜL (ref 0–0.1)
BASOPHILS NFR BLD AUTO: 1 % (ref 0–1)
BILIRUB SERPL-MCNC: 0.28 MG/DL (ref 0.2–1)
BUN SERPL-MCNC: 15 MG/DL (ref 5–25)
CALCIUM SERPL-MCNC: 9.8 MG/DL (ref 8.4–10.2)
CHLORIDE SERPL-SCNC: 105 MMOL/L (ref 96–108)
CO2 SERPL-SCNC: 26 MMOL/L (ref 21–32)
CREAT SERPL-MCNC: 0.42 MG/DL (ref 0.6–1.3)
EOSINOPHIL # BLD AUTO: 0.08 THOUSAND/ÂΜL (ref 0–0.61)
EOSINOPHIL NFR BLD AUTO: 2 % (ref 0–6)
ERYTHROCYTE [DISTWIDTH] IN BLOOD BY AUTOMATED COUNT: 12.3 % (ref 11.6–15.1)
ESTRADIOL SERPL-MCNC: <15 PG/ML
FSH SERPL-ACNC: 22.6 MIU/ML
GFR SERPL CREATININE-BSD FRML MDRD: 123 ML/MIN/1.73SQ M
GLUCOSE SERPL-MCNC: 92 MG/DL (ref 65–140)
HCT VFR BLD AUTO: 37.5 % (ref 34.8–46.1)
HGB BLD-MCNC: 12.4 G/DL (ref 11.5–15.4)
IMM GRANULOCYTES # BLD AUTO: 0.01 THOUSAND/UL (ref 0–0.2)
IMM GRANULOCYTES NFR BLD AUTO: 0 % (ref 0–2)
LYMPHOCYTES # BLD AUTO: 1.44 THOUSANDS/ÂΜL (ref 0.6–4.47)
LYMPHOCYTES NFR BLD AUTO: 39 % (ref 14–44)
MCH RBC QN AUTO: 32.6 PG (ref 26.8–34.3)
MCHC RBC AUTO-ENTMCNC: 33.1 G/DL (ref 31.4–37.4)
MCV RBC AUTO: 99 FL (ref 82–98)
MONOCYTES # BLD AUTO: 0.41 THOUSAND/ÂΜL (ref 0.17–1.22)
MONOCYTES NFR BLD AUTO: 11 % (ref 4–12)
NEUTROPHILS # BLD AUTO: 1.74 THOUSANDS/ÂΜL (ref 1.85–7.62)
NEUTS SEG NFR BLD AUTO: 47 % (ref 43–75)
NRBC BLD AUTO-RTO: 0 /100 WBCS
PLATELET # BLD AUTO: 243 THOUSANDS/UL (ref 149–390)
PMV BLD AUTO: 8.9 FL (ref 8.9–12.7)
POTASSIUM SERPL-SCNC: 5.1 MMOL/L (ref 3.5–5.3)
PROT SERPL-MCNC: 6.8 G/DL (ref 6.4–8.4)
RBC # BLD AUTO: 3.8 MILLION/UL (ref 3.81–5.12)
SODIUM SERPL-SCNC: 139 MMOL/L (ref 135–147)
WBC # BLD AUTO: 3.7 THOUSAND/UL (ref 4.31–10.16)

## 2024-07-13 PROCEDURE — 80053 COMPREHEN METABOLIC PANEL: CPT

## 2024-07-13 PROCEDURE — 85025 COMPLETE CBC W/AUTO DIFF WBC: CPT

## 2024-07-13 PROCEDURE — 83001 ASSAY OF GONADOTROPIN (FSH): CPT

## 2024-07-13 PROCEDURE — 36415 COLL VENOUS BLD VENIPUNCTURE: CPT

## 2024-07-13 PROCEDURE — 82670 ASSAY OF TOTAL ESTRADIOL: CPT

## 2024-08-21 DIAGNOSIS — I10 HYPERTENSION, UNSPECIFIED TYPE: ICD-10-CM

## 2024-08-21 RX ORDER — LISINOPRIL 5 MG/1
5 TABLET ORAL DAILY
Qty: 100 TABLET | Refills: 1 | Status: SHIPPED | OUTPATIENT
Start: 2024-08-21

## 2024-08-21 NOTE — TELEPHONE ENCOUNTER
Reason for call:   [x] Refill   [] Prior Auth  [] Other:     Office:   [x] [facility]  Authorized By: CHINTAN Brambila  [] Specialty/Provider -     Medication: lisinopril (ZESTRIL) 5 mg tablet     Dose/Frequency: Take 1 tablet (5 mg total) by mouth daily,     Quantity:  90 tablet     Pharmacy: [id number] [place] [address] [address]     Does the patient have enough for 3 days?   [] Yes   [x] No - Send as HP to POD

## 2024-11-12 ENCOUNTER — OFFICE VISIT (OUTPATIENT)
Age: 49
End: 2024-11-12
Payer: COMMERCIAL

## 2024-11-12 VITALS
OXYGEN SATURATION: 99 % | WEIGHT: 161.4 LBS | HEIGHT: 62 IN | TEMPERATURE: 98.8 F | SYSTOLIC BLOOD PRESSURE: 140 MMHG | BODY MASS INDEX: 29.7 KG/M2 | HEART RATE: 89 BPM | DIASTOLIC BLOOD PRESSURE: 90 MMHG

## 2024-11-12 DIAGNOSIS — R79.89 ABNORMAL TSH: ICD-10-CM

## 2024-11-12 DIAGNOSIS — K92.1 BLOOD IN THE STOOL: ICD-10-CM

## 2024-11-12 DIAGNOSIS — K21.9 GASTROESOPHAGEAL REFLUX DISEASE WITHOUT ESOPHAGITIS: ICD-10-CM

## 2024-11-12 DIAGNOSIS — Z13.228 SCREENING FOR METABOLIC DISORDER: ICD-10-CM

## 2024-11-12 DIAGNOSIS — Z90.79 S/P ABDOMINAL HYSTERECTOMY AND LEFT SALPINGO-OOPHORECTOMY: Primary | ICD-10-CM

## 2024-11-12 DIAGNOSIS — D70.8 OTHER NEUTROPENIA (HCC): ICD-10-CM

## 2024-11-12 DIAGNOSIS — N87.9 CERVICAL DYSPLASIA: ICD-10-CM

## 2024-11-12 DIAGNOSIS — Z90.721 S/P ABDOMINAL HYSTERECTOMY AND LEFT SALPINGO-OOPHORECTOMY: Primary | ICD-10-CM

## 2024-11-12 DIAGNOSIS — I10 HYPERTENSION, UNSPECIFIED TYPE: ICD-10-CM

## 2024-11-12 DIAGNOSIS — C50.919 MALIGNANT NEOPLASM OF FEMALE BREAST, UNSPECIFIED ESTROGEN RECEPTOR STATUS, UNSPECIFIED LATERALITY, UNSPECIFIED SITE OF BREAST (HCC): ICD-10-CM

## 2024-11-12 DIAGNOSIS — E78.5 HYPERLIPIDEMIA, UNSPECIFIED HYPERLIPIDEMIA TYPE: ICD-10-CM

## 2024-11-12 DIAGNOSIS — Z90.710 S/P ABDOMINAL HYSTERECTOMY AND LEFT SALPINGO-OOPHORECTOMY: Primary | ICD-10-CM

## 2024-11-12 DIAGNOSIS — E78.2 MIXED HYPERLIPIDEMIA: ICD-10-CM

## 2024-11-12 DIAGNOSIS — R73.01 ELEVATED FASTING GLUCOSE: ICD-10-CM

## 2024-11-12 DIAGNOSIS — E03.9 HYPOTHYROIDISM, UNSPECIFIED TYPE: ICD-10-CM

## 2024-11-12 PROCEDURE — 99215 OFFICE O/P EST HI 40 MIN: CPT | Performed by: NURSE PRACTITIONER

## 2024-11-12 RX ORDER — EXEMESTANE 25 MG/1
25 TABLET ORAL DAILY
COMMUNITY
Start: 2024-10-30

## 2024-11-12 NOTE — PROGRESS NOTES
Ambulatory Visit  Name: Rosalba Vazquez      : 1975      MRN: [id number]  Encounter Provider: CHINTAN Brambila  Encounter Date: 2024   Encounter department: [facility] [place]    Assessment & Plan  S/P abdominal hysterectomy and left salpingo-oophorectomy         Malignant neoplasm of female breast, unspecified estrogen receptor status, unspecified laterality, unspecified site of breast (HCC)  -Status post bilateral mastectomy, continue to follow with hematology       Screening for metabolic disorder    Orders:    CBC and differential    Comprehensive metabolic panel; Future    Lipid panel    Gastroesophageal reflux disease without esophagitis  -Continue on omeprazole  - You may use OTC tums as needed.  Recommend small frequent meals throughout the day.  Avoid aggravating foods - spicy foods, acidic foods - such as tomato and citrus.  Avoid alcohol.  Do not lay down for at least 30 mins after eating.         Orders:    omeprazole (PriLOSEC) 20 mg delayed release capsule; Take 1 capsule (20 mg total) by mouth daily before breakfast    Elevated fasting glucose    Orders:    Hemoglobin A1C    Hyperlipidemia, unspecified hyperlipidemia type    Orders:    Lipid panel    Blood in the stool  -Will get blood work  -Will get Hemoccult  -Referral to GI    Orders:    Iron Panel (Includes Ferritin, Iron Sat%, Iron, and TIBC); Future    Vitamin B12; Future    Occult blood x 3, stool; Future    Ambulatory Referral to Gastroenterology; Future    Abnormal TSH    Orders:    TSH, 3rd generation with Free T4 reflex    Hypothyroidism, unspecified type    Orders:    TSH, 3rd generation with Free T4 reflex    Hypertension, unspecified type  Well controlled on lisinopril.   Mildly elevated while in the office today  Continue current regimen -   Continue to monitor blood pressure at home.  Goal BP is < 130/80.  Contact our office for consistent elevations.  Recommend low sodium  diet.  Exercise 30 minutes 5 times a week as tolerated.  Recommend yearly eye exam.            Cervical dysplasia  Status post hysterectomy  Following Gyn oncology         Other neutropenia (HCC)  Continue to monitor CBC   Continue to follow with hematology          Mixed hyperlipidemia  Currently taking fish oil. Recommend healthy lifestyle choices for your cholesterol.  Low fat/low cholesterol diet.  Limit/avoid red meat.  Eat more lean meat - chicken breast, ground turkey, fish.  Exercise 30 mins at least 5 times a week as tolerated.             BMI 29.0-29.9,adult  -Discussed dietary changes and increased exercise  -Will get fasting blood work and discuss potential weight loss medication            History of Present Illness     Patient presents today for follow-up.   Reviewed blood work while in office today.  Patients oncologist suggested that she see her PCP to discuss menopause and weight gain   I did advise her to follow-up with her gynecologist for menopause like symptoms    Has gained weight, but has not been eating as well     She reports that she has been having blood in her stools every other day for a couple of weeks   Denies contaption or diarrhea   Had colonsocpy last year   Denies history of hemorrhoids    She reports fatigue     She also reports heart burn, takes Prilosec     HTN-well controlled on lisinopril, denies side effects with this medication     Cervical dysplasia-history of abdominal hysterectomy and left salpingo-oophorectomy       Breast CA- history of bilateral mastectomy, taking tamoxifen  Currently taking exemestane (AROMASIN) 25 MG tablet      Mixed hyperlipidemia- currently taking fish oil, well controlled, cholesterol 201 LDL 81    Other neutropenia- chronic for patient, patient reports that in the past she has been noted to have low white blood cell count, and has seen Hematology in the past    Saw Rheumatology   +YORDAN, dry eyes with negative Sjogren's antibodies and mild  "leukopenia:  All other labs negative / normal  - no evidence to support diagnosis of systemic autoimmune condition     She reports some seasonal depression- she works from home  and dose not get out much, she defers medication, also offered talk therapy             History obtained from : patient  Review of Systems   Constitutional:  Positive for fatigue. Negative for activity change, appetite change, chills, diaphoresis and fever.   HENT:  Negative for congestion, ear discharge, ear pain, postnasal drip, rhinorrhea, sinus pressure, sinus pain and sore throat.    Eyes:  Negative for pain, discharge, itching and visual disturbance.   Respiratory:  Negative for cough, chest tightness, shortness of breath and wheezing.    Cardiovascular:  Negative for chest pain, palpitations and leg swelling.   Gastrointestinal:  Positive for blood in stool. Negative for abdominal pain, constipation, diarrhea, nausea and vomiting.   Endocrine: Negative for polydipsia, polyphagia and polyuria.   Genitourinary:  Negative for difficulty urinating, dysuria and urgency.   Musculoskeletal:  Negative for arthralgias, back pain and neck pain.   Skin:  Negative for rash and wound.   Neurological:  Negative for dizziness, weakness, numbness and headaches.           Objective     /90 (BP Location: Left arm, Patient Position: Sitting, Cuff Size: Standard)   Pulse 89   Temp 98.8 °F (37.1 °C) (Temporal)   Ht 5' 1.93\" (1.573 m)   Wt 73.2 kg (161 lb 6.4 oz)   SpO2 99%   BMI 29.59 kg/m²     Physical Exam  Constitutional:       General: She is not in acute distress.     Appearance: She is well-developed. She is not diaphoretic.   HENT:      Head: Normocephalic and atraumatic.      Right Ear: External ear normal.      Left Ear: External ear normal.      Nose: Nose normal.      Mouth/Throat:      Mouth: Mucous membranes are moist.      Pharynx: No oropharyngeal exudate or posterior oropharyngeal erythema.   Eyes:      General:         Right " eye: No discharge.         Left eye: No discharge.      Conjunctiva/sclera: Conjunctivae normal.      Pupils: Pupils are equal, round, and reactive to light.   Neck:      Thyroid: No thyromegaly.   Cardiovascular:      Rate and Rhythm: Normal rate and regular rhythm.      Heart sounds: Normal heart sounds. No murmur heard.     No friction rub. No gallop.   Pulmonary:      Effort: Pulmonary effort is normal. No respiratory distress.      Breath sounds: Normal breath sounds. No stridor. No wheezing or rales.   Abdominal:      General: Bowel sounds are normal. There is no distension.      Palpations: Abdomen is soft.      Tenderness: There is no abdominal tenderness.   Musculoskeletal:      Cervical back: Normal range of motion and neck supple.   Lymphadenopathy:      Cervical: No cervical adenopathy.   Skin:     General: Skin is warm and dry.      Findings: No erythema or rash.   Neurological:      Mental Status: She is alert and oriented to person, place, and time.   Psychiatric:         Behavior: Behavior normal.         Thought Content: Thought content normal.         Judgment: Judgment normal.

## 2024-11-13 PROBLEM — K21.9 GASTROESOPHAGEAL REFLUX DISEASE WITHOUT ESOPHAGITIS: Status: ACTIVE | Noted: 2024-11-13

## 2024-11-13 PROBLEM — K92.1 BLOOD IN THE STOOL: Status: ACTIVE | Noted: 2024-11-13

## 2024-11-13 NOTE — ASSESSMENT & PLAN NOTE
-Continue on omeprazole  - You may use OTC tums as needed.  Recommend small frequent meals throughout the day.  Avoid aggravating foods - spicy foods, acidic foods - such as tomato and citrus.  Avoid alcohol.  Do not lay down for at least 30 mins after eating.         Orders:    omeprazole (PriLOSEC) 20 mg delayed release capsule; Take 1 capsule (20 mg total) by mouth daily before breakfast

## 2024-11-13 NOTE — ASSESSMENT & PLAN NOTE
-Will get blood work  -Will get Hemoccult  -Referral to GI    Orders:    Iron Panel (Includes Ferritin, Iron Sat%, Iron, and TIBC); Future    Vitamin B12; Future    Occult blood x 3, stool; Future    Ambulatory Referral to Gastroenterology; Future

## 2024-11-13 NOTE — ASSESSMENT & PLAN NOTE
Well controlled on lisinopril.   Mildly elevated while in the office today  Continue current regimen -   Continue to monitor blood pressure at home.  Goal BP is < 130/80.  Contact our office for consistent elevations.  Recommend low sodium diet.  Exercise 30 minutes 5 times a week as tolerated.  Recommend yearly eye exam.

## 2024-11-13 NOTE — ASSESSMENT & PLAN NOTE
-Discussed dietary changes and increased exercise  -Will get fasting blood work and discuss potential weight loss medication

## 2024-11-14 ENCOUNTER — APPOINTMENT (OUTPATIENT)
Dept: LAB | Facility: CLINIC | Age: 49
End: 2024-11-14
Payer: COMMERCIAL

## 2024-11-14 ENCOUNTER — RESULTS FOLLOW-UP (OUTPATIENT)
Dept: INTERNAL MEDICINE CLINIC | Age: 49
End: 2024-11-14

## 2024-11-14 DIAGNOSIS — Z13.228 SCREENING FOR METABOLIC DISORDER: ICD-10-CM

## 2024-11-14 DIAGNOSIS — R19.5 POSITIVE FIT (FECAL IMMUNOCHEMICAL TEST): Primary | ICD-10-CM

## 2024-11-14 DIAGNOSIS — K92.1 BLOOD IN THE STOOL: ICD-10-CM

## 2024-11-14 DIAGNOSIS — D64.9 LOW HEMOGLOBIN: ICD-10-CM

## 2024-11-14 LAB
ALBUMIN SERPL BCG-MCNC: 4.1 G/DL (ref 3.5–5)
ALP SERPL-CCNC: 50 U/L (ref 34–104)
ALT SERPL W P-5'-P-CCNC: 34 U/L (ref 7–52)
ANION GAP SERPL CALCULATED.3IONS-SCNC: 7 MMOL/L (ref 4–13)
AST SERPL W P-5'-P-CCNC: 30 U/L (ref 13–39)
BASOPHILS # BLD AUTO: 0.03 THOUSANDS/ÂΜL (ref 0–0.1)
BASOPHILS NFR BLD AUTO: 1 % (ref 0–1)
BILIRUB SERPL-MCNC: 0.26 MG/DL (ref 0.2–1)
BUN SERPL-MCNC: 17 MG/DL (ref 5–25)
CALCIUM SERPL-MCNC: 9.4 MG/DL (ref 8.4–10.2)
CHLORIDE SERPL-SCNC: 105 MMOL/L (ref 96–108)
CHOLEST SERPL-MCNC: 237 MG/DL (ref ?–200)
CO2 SERPL-SCNC: 28 MMOL/L (ref 21–32)
CREAT SERPL-MCNC: 0.43 MG/DL (ref 0.6–1.3)
EOSINOPHIL # BLD AUTO: 0.14 THOUSAND/ÂΜL (ref 0–0.61)
EOSINOPHIL NFR BLD AUTO: 4 % (ref 0–6)
ERYTHROCYTE [DISTWIDTH] IN BLOOD BY AUTOMATED COUNT: 12.7 % (ref 11.6–15.1)
EST. AVERAGE GLUCOSE BLD GHB EST-MCNC: 117 MG/DL
FERRITIN SERPL-MCNC: 22 NG/ML (ref 11–307)
GFR SERPL CREATININE-BSD FRML MDRD: 121 ML/MIN/1.73SQ M
GLUCOSE P FAST SERPL-MCNC: 95 MG/DL (ref 65–99)
HBA1C MFR BLD: 5.7 %
HCT VFR BLD AUTO: 36.5 % (ref 34.8–46.1)
HDLC SERPL-MCNC: 109 MG/DL
HEMOCCULT STL QL IA: POSITIVE
HGB BLD-MCNC: 11.9 G/DL (ref 11.5–15.4)
IMM GRANULOCYTES # BLD AUTO: 0.02 THOUSAND/UL (ref 0–0.2)
IMM GRANULOCYTES NFR BLD AUTO: 1 % (ref 0–2)
IRON SATN MFR SERPL: 30 % (ref 15–50)
IRON SERPL-MCNC: 136 UG/DL (ref 50–212)
LDLC SERPL CALC-MCNC: 113 MG/DL (ref 0–100)
LYMPHOCYTES # BLD AUTO: 1.18 THOUSANDS/ÂΜL (ref 0.6–4.47)
LYMPHOCYTES NFR BLD AUTO: 32 % (ref 14–44)
MCH RBC QN AUTO: 32.1 PG (ref 26.8–34.3)
MCHC RBC AUTO-ENTMCNC: 32.6 G/DL (ref 31.4–37.4)
MCV RBC AUTO: 98 FL (ref 82–98)
MONOCYTES # BLD AUTO: 0.48 THOUSAND/ÂΜL (ref 0.17–1.22)
MONOCYTES NFR BLD AUTO: 13 % (ref 4–12)
NEUTROPHILS # BLD AUTO: 1.87 THOUSANDS/ÂΜL (ref 1.85–7.62)
NEUTS SEG NFR BLD AUTO: 49 % (ref 43–75)
NONHDLC SERPL-MCNC: 128 MG/DL
NRBC BLD AUTO-RTO: 0 /100 WBCS
PLATELET # BLD AUTO: 254 THOUSANDS/UL (ref 149–390)
PMV BLD AUTO: 8.6 FL (ref 8.9–12.7)
POTASSIUM SERPL-SCNC: 4.4 MMOL/L (ref 3.5–5.3)
PROT SERPL-MCNC: 6.8 G/DL (ref 6.4–8.4)
RBC # BLD AUTO: 3.71 MILLION/UL (ref 3.81–5.12)
SODIUM SERPL-SCNC: 140 MMOL/L (ref 135–147)
TIBC SERPL-MCNC: 455 UG/DL (ref 250–450)
TRIGL SERPL-MCNC: 74 MG/DL (ref ?–150)
TSH SERPL DL<=0.05 MIU/L-ACNC: 2.57 UIU/ML (ref 0.45–4.5)
UIBC SERPL-MCNC: 319 UG/DL (ref 155–355)
VIT B12 SERPL-MCNC: 1207 PG/ML (ref 180–914)
WBC # BLD AUTO: 3.72 THOUSAND/UL (ref 4.31–10.16)

## 2024-11-14 PROCEDURE — 80053 COMPREHEN METABOLIC PANEL: CPT

## 2024-11-14 PROCEDURE — 83550 IRON BINDING TEST: CPT

## 2024-11-14 PROCEDURE — 82728 ASSAY OF FERRITIN: CPT

## 2024-11-14 PROCEDURE — 82607 VITAMIN B-12: CPT

## 2024-11-14 PROCEDURE — 83036 HEMOGLOBIN GLYCOSYLATED A1C: CPT | Performed by: NURSE PRACTITIONER

## 2024-11-14 PROCEDURE — G0328 FECAL BLOOD SCRN IMMUNOASSAY: HCPCS

## 2024-11-14 PROCEDURE — 84443 ASSAY THYROID STIM HORMONE: CPT | Performed by: NURSE PRACTITIONER

## 2024-11-14 PROCEDURE — 80061 LIPID PANEL: CPT | Performed by: NURSE PRACTITIONER

## 2024-11-14 PROCEDURE — 36415 COLL VENOUS BLD VENIPUNCTURE: CPT

## 2024-11-14 PROCEDURE — 83540 ASSAY OF IRON: CPT

## 2024-11-25 ENCOUNTER — CONSULT (OUTPATIENT)
Age: 49
End: 2024-11-25
Payer: COMMERCIAL

## 2024-11-25 VITALS
HEIGHT: 62 IN | OXYGEN SATURATION: 98 % | TEMPERATURE: 98.7 F | HEART RATE: 88 BPM | WEIGHT: 158.6 LBS | BODY MASS INDEX: 29.19 KG/M2

## 2024-11-25 DIAGNOSIS — R19.8 STRAINING DURING BOWEL MOVEMENTS: Primary | ICD-10-CM

## 2024-11-25 DIAGNOSIS — K92.1 BLOOD IN THE STOOL: ICD-10-CM

## 2024-11-25 PROCEDURE — 99214 OFFICE O/P EST MOD 30 MIN: CPT | Performed by: INTERNAL MEDICINE

## 2024-11-25 RX ORDER — POLYETHYLENE GLYCOL 3350, SODIUM SULFATE ANHYDROUS, SODIUM BICARBONATE, SODIUM CHLORIDE, POTASSIUM CHLORIDE 236; 22.74; 6.74; 5.86; 2.97 G/4L; G/4L; G/4L; G/4L; G/4L
4000 POWDER, FOR SOLUTION ORAL ONCE
Qty: 4000 ML | Refills: 0 | Status: SHIPPED | OUTPATIENT
Start: 2024-11-25 | End: 2024-11-25

## 2024-11-25 RX ORDER — SODIUM CHLORIDE, SODIUM LACTATE, POTASSIUM CHLORIDE, CALCIUM CHLORIDE 600; 310; 30; 20 MG/100ML; MG/100ML; MG/100ML; MG/100ML
125 INJECTION, SOLUTION INTRAVENOUS CONTINUOUS
OUTPATIENT
Start: 2024-11-25

## 2024-11-25 NOTE — PROGRESS NOTES
Name: Rosalba Vazquez      : 1975      MRN: [id number]  Encounter Provider: Jasmin Lay DO  Encounter Date: 2024   Encounter department: [facility]  :  Assessment & Plan  Blood in the stool  Patient is presenting with positive fit test, last colonoscopy in  did not show any abnormalities. However due to history of breast cancer in 2018 as well as new onset blood in stool patient would benefit from a repeat colonoscopy to rule out any concerns of malignancy.  Orders:    Ambulatory Referral to Gastroenterology    Colonoscopy; Future    polyethylene glycol (Golytely) 4000 mL solution; Take 4,000 mL by mouth once for 1 dose Take 4000 mL by mouth once for 1 dose. Use as directed    Straining during bowel movements             History of Present Illness     HPI  Rosalba Vazquez is a 49 y.o. female who has a past medical history of breast cancer in , hysterectomy in May of this year presents blood in stool.  Patient started noticing bright red blood per rectum in September of this year it has been ongoing since then it occurs usually daily noticeable with wiping as well as in the toilet bowl.  Patient denies any unintentional weight loss or any abdominal discomfort. She does endorse some straining with bowel movements which has been a chronic issue however for noticing it more post hysterectomy in May of this year.  Patient went to her PCP and had a fit test done which was positive.    Colonoscopy was in  which did not show any abnormalities at that time.      Review of Systems, per HPI  Past Medical History   Past Medical History:   Diagnosis Date    Allergic     Cancer (HCC) 2018    breast masectomy     Past Surgical History:   Procedure Laterality Date    BREAST SURGERY  2018    HYSTERECTOMY       Family History   Problem Relation Age of Onset    No Known Problems Mother     No Known Problems Father     No Known Problems Brother   "     reports that she has never smoked. She has never used smokeless tobacco. She reports that she does not currently use alcohol. She reports that she does not use drugs.  Current Outpatient Medications on File Prior to Visit   Medication Sig Dispense Refill    cetirizine (ZyrTEC) 10 mg tablet Take 10 mg by mouth daily      cholecalciferol (VITAMIN D3) 1,000 units tablet Take 1,000 Units by mouth daily      Cyanocobalamin (VITAMIN B 12 PO) Take by mouth in the morning B12 5000 mg,B6 3400 mg,B1 2400 mg,niacin and folate      exemestane (AROMASIN) 25 MG tablet Take 25 mg by mouth daily      lisinopril (ZESTRIL) 5 mg tablet Take 1 tablet (5 mg total) by mouth daily 100 tablet 1    Multiple Vitamin (multivitamin) tablet Take 1 tablet by mouth daily      Omega-3 Fatty Acids (fish oil) 1,000 mg Take 1,000 mg by mouth daily      omeprazole (PriLOSEC) 20 mg delayed release capsule Take 1 capsule (20 mg total) by mouth daily before breakfast 90 capsule 3    tamoxifen (NOLVADEX) 20 mg tablet Take 20 mg by mouth daily       No current facility-administered medications on file prior to visit.   No Known Allergies   Pertinent Medical History   Breast cancer 2018  Hysterectomy May 2024          Objective   Pulse 88   Temp 98.7 °F (37.1 °C) (Temporal)   Ht 5' 1.93\" (1.573 m)   Wt 71.9 kg (158 lb 9.6 oz)   SpO2 98%   BMI 29.07 kg/m²      Physical Exam  Vitals reviewed.   Pulmonary:      Effort: Pulmonary effort is normal.   Abdominal:      General: Abdomen is flat.   Neurological:      Mental Status: She is alert and oriented to person, place, and time.   Psychiatric:         Mood and Affect: Mood normal.         Alondra Owens MD  Gastroenterology Fellow  [facility]  Division of Gastroenterology and Hepatology    "

## 2024-11-25 NOTE — ASSESSMENT & PLAN NOTE
Patient is presenting with positive fit test, last colonoscopy in 2023 did not show any abnormalities. However due to history of breast cancer in 2018 as well as new onset blood in stool patient would benefit from a repeat colonoscopy to rule out any concerns of malignancy.  Orders:    Ambulatory Referral to Gastroenterology    Colonoscopy; Future    polyethylene glycol (Golytely) 4000 mL solution; Take 4,000 mL by mouth once for 1 dose Take 4000 mL by mouth once for 1 dose. Use as directed

## 2024-12-03 ENCOUNTER — TELEPHONE (OUTPATIENT)
Age: 49
End: 2024-12-03

## 2024-12-03 NOTE — TELEPHONE ENCOUNTER
Patients GI provider:  Dr. Lay    Number to return call: [phone]    Reason for call: Pt called in regard to scheduled colonoscopy. Pt stated the reason for the office visit has now resolved. Pt is asking if she still needs to proceed with the colonoscopy.     Scheduled procedure/appointment date if applicable: 12/11/2024

## 2024-12-06 ENCOUNTER — OFFICE VISIT (OUTPATIENT)
Age: 49
End: 2024-12-06
Payer: COMMERCIAL

## 2024-12-06 VITALS
OXYGEN SATURATION: 99 % | WEIGHT: 161 LBS | DIASTOLIC BLOOD PRESSURE: 82 MMHG | HEART RATE: 94 BPM | TEMPERATURE: 98.5 F | HEIGHT: 62 IN | SYSTOLIC BLOOD PRESSURE: 130 MMHG | BODY MASS INDEX: 29.63 KG/M2

## 2024-12-06 DIAGNOSIS — C50.919 MALIGNANT NEOPLASM OF FEMALE BREAST, UNSPECIFIED ESTROGEN RECEPTOR STATUS, UNSPECIFIED LATERALITY, UNSPECIFIED SITE OF BREAST (HCC): ICD-10-CM

## 2024-12-06 DIAGNOSIS — E78.2 MIXED HYPERLIPIDEMIA: ICD-10-CM

## 2024-12-06 DIAGNOSIS — Z78.0 POST-MENOPAUSAL: Primary | ICD-10-CM

## 2024-12-06 DIAGNOSIS — R73.03 PREDIABETES: ICD-10-CM

## 2024-12-06 DIAGNOSIS — E55.9 VITAMIN D DEFICIENCY: ICD-10-CM

## 2024-12-06 DIAGNOSIS — I10 HYPERTENSION, UNSPECIFIED TYPE: ICD-10-CM

## 2024-12-06 DIAGNOSIS — K92.1 BLOOD IN THE STOOL: ICD-10-CM

## 2024-12-06 DIAGNOSIS — D64.9 LOW HEMOGLOBIN: ICD-10-CM

## 2024-12-06 PROCEDURE — 99214 OFFICE O/P EST MOD 30 MIN: CPT | Performed by: NURSE PRACTITIONER

## 2024-12-06 RX ORDER — GABAPENTIN 100 MG/1
100 CAPSULE ORAL
Qty: 90 CAPSULE | Refills: 0 | Status: SHIPPED | OUTPATIENT
Start: 2024-12-06

## 2024-12-06 NOTE — ASSESSMENT & PLAN NOTE
-Discussed dietary changes and increased exercise  -Will start wegovy     Orders:    Semaglutide-Weight Management (WEGOVY) 0.25 MG/0.5ML; Inject 0.5 mL (0.25 mg total) under the skin once a week for 28 days

## 2024-12-06 NOTE — ASSESSMENT & PLAN NOTE
Patient is to continue to work on diet and exercise.  Limit sugars and carbohydrate intake.    Avoid soda, juice, sweets, cookies, desserts, pasta, bread.   Eat more whole grains, exercised 30 min of cardio at least 3 times a week.  Also recommended daily foot exams to check for sores, and recommended yearly eye exams.     -start wegovy    Orders:    Semaglutide-Weight Management (WEGOVY) 0.25 MG/0.5ML; Inject 0.5 mL (0.25 mg total) under the skin once a week for 28 days

## 2024-12-06 NOTE — PROGRESS NOTES
Name: Rosalba Vazquez      : 1975      MRN: [id number]  Encounter Provider: CHINTAN Brambila  Encounter Date: 2024   [facility]  :  Assessment & Plan  Post-menopausal    Orders:    gabapentin (NEURONTIN) 100 mg capsule; Take 1 capsule (100 mg total) by mouth daily at bedtime    BMI 29.0-29.9,adult  -Discussed dietary changes and increased exercise  -Will start wegovy     Orders:    Semaglutide-Weight Management (WEGOVY) 0.25 MG/0.5ML; Inject 0.5 mL (0.25 mg total) under the skin once a week for 28 days      Prediabetes  Patient is to continue to work on diet and exercise.  Limit sugars and carbohydrate intake.    Avoid soda, juice, sweets, cookies, desserts, pasta, bread.   Eat more whole grains, exercised 30 min of cardio at least 3 times a week.  Also recommended daily foot exams to check for sores, and recommended yearly eye exams.     -start wegovy    Orders:    Semaglutide-Weight Management (WEGOVY) 0.25 MG/0.5ML; Inject 0.5 mL (0.25 mg total) under the skin once a week for 28 days    Hypertension, unspecified type  Well controlled on lisinopril.   Mildly elevated while in the office today  Continue current regimen -   Continue to monitor blood pressure at home.  Goal BP is < 130/80.  Contact our office for consistent elevations.  Recommend low sodium diet.  Exercise 30 minutes 5 times a week as tolerated.  Recommend yearly eye exam.              Low hemoglobin  -CBC unremarkable  -B12 and iron levels sufficient         Malignant neoplasm of female breast, unspecified estrogen receptor status, unspecified laterality, unspecified site of breast (HCC)  -Status post bilateral mastectomy, continue to follow with hematology         Vitamin D deficiency  Continue with supplementation.          Mixed hyperlipidemia  Currently taking fish oil. Recommend healthy lifestyle choices for your cholesterol.  Low fat/low cholesterol diet.   Limit/avoid red meat.  Eat more lean meat - chicken breast, ground turkey, fish.  Exercise 30 mins at least 5 times a week as tolerated.               Blood in the stool  -continue to follow GI                History of Present Illness     Patient presents today for follow-up.   Reviewed blood work while in office today.  Patients oncologist suggested that she see her PCP to discuss menopause and weight gain   I did advise her to follow-up with her gynecologist for menopause like symptoms    Has gained weight  Hemoglobin A1c 5.7  Cholesterol 237 triglycerides 74,      Had positive fit test, saw GI and is now scheduled for colonoscopy    She reports fatigue, ferritin level 22, B12 1207, TSH within normal range to 2.566    She also reports heart burn, takes Prilosec     HTN-well controlled on lisinopril, denies side effects with this medication     Cervical dysplasia-history of abdominal hysterectomy and left salpingo-oophorectomy       Breast CA- history of bilateral mastectomy, taking tamoxifen  Currently taking exemestane (AROMASIN) 25 MG tablet      Mixed hyperlipidemia- currently taking fish oil, well controlled, cholesterol 201 LDL 81    Other neutropenia- chronic for patient, patient reports that in the past she has been noted to have low white blood cell count, and has seen Hematology in the past    Saw Rheumatology   +YORDAN, dry eyes with negative Sjogren's antibodies and mild leukopenia:  All other labs negative / normal  - no evidence to support diagnosis of systemic autoimmune condition     She reports some seasonal depression- she works from home  and dose not get out much, she defers medication, also offered talk therapy           Review of Systems   Constitutional:  Negative for activity change, appetite change, chills, diaphoresis and fever.   HENT:  Negative for congestion, ear discharge, ear pain, postnasal drip, rhinorrhea, sinus pressure, sinus pain and sore throat.    Eyes:  Negative  "for pain, discharge, itching and visual disturbance.   Respiratory:  Negative for cough, chest tightness, shortness of breath and wheezing.    Cardiovascular:  Negative for chest pain, palpitations and leg swelling.   Gastrointestinal:  Negative for abdominal pain, constipation, diarrhea, nausea and vomiting.   Endocrine: Negative for polydipsia, polyphagia and polyuria.   Genitourinary:  Negative for difficulty urinating, dysuria and urgency.   Musculoskeletal:  Negative for arthralgias, back pain and neck pain.   Skin:  Negative for rash and wound.   Neurological:  Negative for dizziness, weakness, numbness and headaches.        Objective   /82 (BP Location: Right arm, Patient Position: Sitting, Cuff Size: Standard)   Pulse 94   Temp 98.5 °F (36.9 °C) (Temporal)   Ht 5' 1.85\" (1.571 m)   Wt 73 kg (161 lb)   SpO2 99%   BMI 29.59 kg/m²      Physical Exam  Constitutional:       General: She is not in acute distress.     Appearance: She is well-developed. She is not diaphoretic.   HENT:      Head: Normocephalic and atraumatic.      Right Ear: External ear normal.      Left Ear: External ear normal.      Nose: Nose normal.      Mouth/Throat:      Mouth: Mucous membranes are moist.      Pharynx: No oropharyngeal exudate or posterior oropharyngeal erythema.   Eyes:      General:         Right eye: No discharge.         Left eye: No discharge.      Conjunctiva/sclera: Conjunctivae normal.      Pupils: Pupils are equal, round, and reactive to light.   Neck:      Thyroid: No thyromegaly.   Cardiovascular:      Rate and Rhythm: Normal rate and regular rhythm.      Heart sounds: Normal heart sounds. No murmur heard.     No friction rub. No gallop.   Pulmonary:      Effort: Pulmonary effort is normal. No respiratory distress.      Breath sounds: Normal breath sounds. No stridor. No wheezing or rales.   Abdominal:      General: Bowel sounds are normal. There is no distension.      Palpations: Abdomen is soft.      " Tenderness: There is no abdominal tenderness.   Musculoskeletal:      Cervical back: Normal range of motion and neck supple.   Lymphadenopathy:      Cervical: No cervical adenopathy.   Skin:     General: Skin is warm and dry.      Findings: No erythema or rash.   Neurological:      Mental Status: She is alert and oriented to person, place, and time.   Psychiatric:         Behavior: Behavior normal.         Thought Content: Thought content normal.         Judgment: Judgment normal.

## 2024-12-06 NOTE — ASSESSMENT & PLAN NOTE
Orders:    gabapentin (NEURONTIN) 100 mg capsule; Take 1 capsule (100 mg total) by mouth daily at bedtime

## 2024-12-09 ENCOUNTER — TELEPHONE (OUTPATIENT)
Age: 49
End: 2024-12-09

## 2024-12-09 DIAGNOSIS — R73.03 PREDIABETES: ICD-10-CM

## 2024-12-09 NOTE — TELEPHONE ENCOUNTER
Reason for call:   [x] Prior Auth  [] Other:     Caller:  [x] Patient  [] Pharmacy  Name:   Address:   Callback Number:     Medication:   Wegovy 0.25 MG/0.5ML- inject 0.5ml under the skin once a week for 28 days      Ordering Provider:   [x] PCP/Provider -   [] Speciality/Provider -

## 2024-12-09 NOTE — TELEPHONE ENCOUNTER
PA for (WEGOVY) 0.25 MG/0.5ML SUBMITTED to [facility]    via    [x]CMM-KEY: [id number]  []Surescripts-Case ID #   []Availity-Auth ID # NDC #   []Faxed to plan   []Other website   []Phone call Case ID #     []PA sent as URGENT    All office notes, labs and other pertaining documents and studies sent. Clinical questions answered. Awaiting determination from insurance company.     Turnaround time for your insurance to make a decision on your Prior Authorization can take 7-21 business days.             \

## 2024-12-10 NOTE — TELEPHONE ENCOUNTER
PA for (WEGOVY) 0.25 MG/0.5ML EXCLUDED from plan       Reason:(Screenshot if applicable)      Message sent to office clinical pool Yes

## 2024-12-11 RX ORDER — TIRZEPATIDE 2.5 MG/.5ML
2.5 INJECTION, SOLUTION SUBCUTANEOUS WEEKLY
Qty: 2 ML | Refills: 0 | Status: SHIPPED | OUTPATIENT
Start: 2024-12-11 | End: 2024-12-17

## 2024-12-11 NOTE — TELEPHONE ENCOUNTER
Alice ordered as patient reports this is covered by her insurance plan please ensure prior patient is done if needed.

## 2024-12-12 ENCOUNTER — TELEPHONE (OUTPATIENT)
Age: 49
End: 2024-12-12

## 2024-12-12 DIAGNOSIS — R73.03 PREDIABETES: ICD-10-CM

## 2024-12-12 RX ORDER — TIRZEPATIDE 2.5 MG/.5ML
2.5 INJECTION, SOLUTION SUBCUTANEOUS WEEKLY
Qty: 2 ML | Refills: 0 | Status: CANCELLED | OUTPATIENT
Start: 2024-12-12

## 2024-12-12 NOTE — TELEPHONE ENCOUNTER
Patient was on Mounjaro and using a  coupon. Wegovy is excluded along with other weight loss drugs. Is patient going to continue to use  coupon for medication?

## 2024-12-12 NOTE — TELEPHONE ENCOUNTER
Please place an active script for requested medication needing an auth.    Mounjaro will not be covered with or without a prior authorization, Mounjaro is not FDA approved for obesity, prediabetes, etc. Mounjaro is only FDA Approved for Type 2 Diabetes and will only be Approved via a Prior Authorization if patient has a diagnosis of Type 2 Diabetes.

## 2024-12-12 NOTE — TELEPHONE ENCOUNTER
Reason for call:   [x] Prior Auth  [] Other:     Caller:  [x] Patient  [] Pharmacy  Name:   Address:   Callback Number:     Medication: Zepbound 2.5 MG/0.5ML auto-injector     Dose/Frequency: Inject 0.5 mL (2.5 mg total) under the skin once a week     Quantity: 2 ml    Ordering Provider:   [x] PCP/Provider -   [] Speciality/Provider -     Has the patient tried other medications and failed? If failed, which medications did they fail?    [] No   [] Yes -     Is the patient's insurance updated in EPIC?   [x] Yes   [] No     Is a copy of the patient's insurance scanned in EPIC?   [x] Yes   [] No

## 2024-12-12 NOTE — TELEPHONE ENCOUNTER
Forwarding to PA team for review.   Mounjaro      Sent a message over to office to put order in. Patient needs a prior auth for mounjaro

## 2024-12-16 ENCOUNTER — TELEPHONE (OUTPATIENT)
Age: 49
End: 2024-12-16

## 2024-12-16 DIAGNOSIS — R73.03 PREDIABETES: ICD-10-CM

## 2024-12-17 ENCOUNTER — TELEPHONE (OUTPATIENT)
Age: 49
End: 2024-12-17

## 2024-12-17 RX ORDER — TIRZEPATIDE 2.5 MG/.5ML
2.5 INJECTION, SOLUTION SUBCUTANEOUS WEEKLY
Qty: 2 ML | Refills: 0 | Status: SHIPPED | OUTPATIENT
Start: 2024-12-17 | End: 2025-01-16

## 2024-12-17 NOTE — TELEPHONE ENCOUNTER
Spoke with patient. She spoke to her insurance company and they told her with a prior authorization Mounjaro would be covered with diagnosis of pre diabetes please send script for medication.

## 2024-12-17 NOTE — TELEPHONE ENCOUNTER
PA for (Mounjaro) 2.5 MG/0.5ML SOAJ DENIED    Reason:(Screenshot if applicable)        Message sent to office clinical pool Yes    Denial letter scanned into Media Yes    Appeal started No (Provider will need to decide if appeal is warranted and send clinical documentation to Prior Authorization Team for initiation.)    **Please follow up with your patient regarding denial and next steps**

## 2024-12-17 NOTE — TELEPHONE ENCOUNTER
PA for (Mounjaro) 2.5 MG/0.5ML SOAJ SUBMITTED to [facility]    via    []CMM-KEY:   [x]Surescripts-Case ID # [id number]   []Availity-Auth ID # NDC #   []Faxed to plan   []Other website   []Phone call Case ID #     []PA sent as URGENT    All office notes, labs and other pertaining documents and studies sent. Clinical questions answered. Awaiting determination from insurance company.     Turnaround time for your insurance to make a decision on your Prior Authorization can take 7-21 business days.

## 2024-12-17 NOTE — TELEPHONE ENCOUNTER
Spoke with patient medication denied. She is going to dispute with the insurance company because they told her it was covered.She is also going to check with pharmacy to see how much medication would cost out of pocket.

## 2024-12-17 NOTE — TELEPHONE ENCOUNTER
Patient called asking to speak to PCP.. she has questions about seeing weight loss provider . Please advise

## 2025-01-10 ENCOUNTER — OFFICE VISIT (OUTPATIENT)
Age: 50
End: 2025-01-10
Payer: COMMERCIAL

## 2025-01-10 VITALS
BODY MASS INDEX: 29.11 KG/M2 | OXYGEN SATURATION: 99 % | DIASTOLIC BLOOD PRESSURE: 89 MMHG | WEIGHT: 158.2 LBS | HEIGHT: 62 IN | HEART RATE: 83 BPM | TEMPERATURE: 98.7 F | SYSTOLIC BLOOD PRESSURE: 130 MMHG

## 2025-01-10 DIAGNOSIS — R73.01 ELEVATED FASTING GLUCOSE: ICD-10-CM

## 2025-01-10 DIAGNOSIS — M35.00 SICCA COMPLEX (HCC): ICD-10-CM

## 2025-01-10 DIAGNOSIS — I10 HYPERTENSION, UNSPECIFIED TYPE: ICD-10-CM

## 2025-01-10 DIAGNOSIS — C50.919 MALIGNANT NEOPLASM OF FEMALE BREAST, UNSPECIFIED ESTROGEN RECEPTOR STATUS, UNSPECIFIED LATERALITY, UNSPECIFIED SITE OF BREAST (HCC): ICD-10-CM

## 2025-01-10 DIAGNOSIS — Z13.228 SCREENING FOR METABOLIC DISORDER: Primary | ICD-10-CM

## 2025-01-10 DIAGNOSIS — E78.5 HYPERLIPIDEMIA, UNSPECIFIED HYPERLIPIDEMIA TYPE: ICD-10-CM

## 2025-01-10 PROCEDURE — 99214 OFFICE O/P EST MOD 30 MIN: CPT | Performed by: NURSE PRACTITIONER

## 2025-01-10 NOTE — PROGRESS NOTES
Name: Rosalba Vazquez      : 1975      MRN: [id number]  Encounter Provider: CHINTAN Brambila  Encounter Date: 1/10/2025   Encounter department: [facility]  :  Assessment & Plan  Screening for metabolic disorder    Orders:    Comprehensive metabolic panel; Future    CBC and differential    Lipid panel    Elevated fasting glucose    Orders:    Hemoglobin A1C    Hyperlipidemia, unspecified hyperlipidemia type    Orders:    Lipid panel    Malignant neoplasm of female breast, unspecified estrogen receptor status, unspecified laterality, unspecified site of breast (HCC)         Sicca complex (HCC)         Hypertension, unspecified type  Well controlled on lisinopril.   Continue current regimen -   Continue to monitor blood pressure at home.  Goal BP is < 130/80.  Contact our office for consistent elevations.  Recommend low sodium diet.  Exercise 30 minutes 5 times a week as tolerated.  Recommend yearly eye exam.                BMI 29.0-29.9,adult  -Discussed dietary changes and increased exercise  -Continues on Semaglutide through compound pharmacy   -follow up in 3 months or sooner if needed                     History of Present Illness     Patient presents today for follow-up.     Patients oncologist suggested that she see her PCP to discuss menopause and weight gain   I did advise her to follow-up with her gynecologist for menopause like symptoms    Has gained weight, she had been unable to obtain GLP-1 through insurance so she went through compound pharmacy, currently taking 0.25 mg of semaglutide for the past 3 weeks, has lost about 5 pounds, denies any side effects at this time  Hemoglobin A1c 5.7  Cholesterol 237 triglycerides 74,      Had positive fit test, saw GI and is now scheduled for colonoscopy    She reports fatigue, ferritin level 22, B12 1207, TSH within normal range to 2.566    She also reports heart burn, takes Prilosec  "    HTN-well controlled on lisinopril, denies side effects with this medication     Cervical dysplasia-history of abdominal hysterectomy and left salpingo-oophorectomy       Breast CA- history of bilateral mastectomy, taking tamoxifen  Currently taking exemestane (AROMASIN) 25 MG tablet      Mixed hyperlipidemia- currently taking fish oil, well controlled, cholesterol 201 LDL 81    Other neutropenia- chronic for patient, patient reports that in the past she has been noted to have low white blood cell count, and has seen Hematology in the past    Saw Rheumatology   +YORDAN, dry eyes with negative Sjogren's antibodies and mild leukopenia:  All other labs negative / normal  - no evidence to support diagnosis of systemic autoimmune condition     She reports some seasonal depression- she works from home  and dose not get out much, she defers medication, also offered talk therapy           Review of Systems   Constitutional:  Negative for activity change, appetite change, chills, diaphoresis and fever.   HENT:  Negative for congestion, ear discharge, ear pain, postnasal drip, rhinorrhea, sinus pressure, sinus pain and sore throat.    Eyes:  Negative for pain, discharge, itching and visual disturbance.   Respiratory:  Negative for cough, chest tightness, shortness of breath and wheezing.    Cardiovascular:  Negative for chest pain, palpitations and leg swelling.   Gastrointestinal:  Negative for abdominal pain, constipation, diarrhea, nausea and vomiting.   Endocrine: Negative for polydipsia, polyphagia and polyuria.   Genitourinary:  Negative for difficulty urinating, dysuria and urgency.   Musculoskeletal:  Negative for arthralgias, back pain and neck pain.   Skin:  Negative for rash and wound.   Neurological:  Negative for dizziness, weakness, numbness and headaches.       Objective   /89   Pulse 83   Temp 98.7 °F (37.1 °C) (Temporal)   Ht 5' 1.85\" (1.571 m)   Wt 71.8 kg (158 lb 3.2 oz)   SpO2 99%   BMI 29.08 " kg/m²      Physical Exam  Constitutional:       General: She is not in acute distress.     Appearance: She is well-developed. She is not diaphoretic.   HENT:      Head: Normocephalic and atraumatic.      Right Ear: External ear normal.      Left Ear: External ear normal.      Nose: Nose normal.      Mouth/Throat:      Mouth: Mucous membranes are moist.      Pharynx: No oropharyngeal exudate or posterior oropharyngeal erythema.   Eyes:      General:         Right eye: No discharge.         Left eye: No discharge.      Conjunctiva/sclera: Conjunctivae normal.      Pupils: Pupils are equal, round, and reactive to light.   Neck:      Thyroid: No thyromegaly.   Cardiovascular:      Rate and Rhythm: Normal rate and regular rhythm.      Heart sounds: Normal heart sounds. No murmur heard.     No friction rub. No gallop.   Pulmonary:      Effort: Pulmonary effort is normal. No respiratory distress.      Breath sounds: Normal breath sounds. No stridor. No wheezing or rales.   Abdominal:      General: Bowel sounds are normal. There is no distension.      Palpations: Abdomen is soft.      Tenderness: There is no abdominal tenderness.   Musculoskeletal:      Cervical back: Normal range of motion and neck supple.   Lymphadenopathy:      Cervical: No cervical adenopathy.   Skin:     General: Skin is warm and dry.      Findings: No erythema or rash.   Neurological:      Mental Status: She is alert and oriented to person, place, and time.   Psychiatric:         Behavior: Behavior normal.         Thought Content: Thought content normal.         Judgment: Judgment normal.

## 2025-01-10 NOTE — ASSESSMENT & PLAN NOTE
-Discussed dietary changes and increased exercise  -Continues on Semaglutide through compound pharmacy   -follow up in 3 months or sooner if needed

## 2025-02-25 DIAGNOSIS — I10 HYPERTENSION, UNSPECIFIED TYPE: ICD-10-CM

## 2025-02-26 RX ORDER — LISINOPRIL 5 MG/1
5 TABLET ORAL DAILY
Qty: 100 TABLET | Refills: 1 | Status: SHIPPED | OUTPATIENT
Start: 2025-02-26

## 2025-04-05 ENCOUNTER — APPOINTMENT (OUTPATIENT)
Dept: LAB | Facility: CLINIC | Age: 50
End: 2025-04-05
Payer: COMMERCIAL

## 2025-04-05 DIAGNOSIS — Z13.228 SCREENING FOR METABOLIC DISORDER: ICD-10-CM

## 2025-04-05 LAB
ALBUMIN SERPL BCG-MCNC: 4.2 G/DL (ref 3.5–5)
ALP SERPL-CCNC: 54 U/L (ref 34–104)
ALT SERPL W P-5'-P-CCNC: 49 U/L (ref 7–52)
ANION GAP SERPL CALCULATED.3IONS-SCNC: 6 MMOL/L (ref 4–13)
AST SERPL W P-5'-P-CCNC: 40 U/L (ref 13–39)
BASOPHILS # BLD AUTO: 0.02 THOUSANDS/ÂΜL (ref 0–0.1)
BASOPHILS NFR BLD AUTO: 1 % (ref 0–1)
BILIRUB SERPL-MCNC: 0.31 MG/DL (ref 0.2–1)
BUN SERPL-MCNC: 10 MG/DL (ref 5–25)
CALCIUM SERPL-MCNC: 9.5 MG/DL (ref 8.4–10.2)
CHLORIDE SERPL-SCNC: 107 MMOL/L (ref 96–108)
CHOLEST SERPL-MCNC: 193 MG/DL (ref ?–200)
CO2 SERPL-SCNC: 29 MMOL/L (ref 21–32)
CREAT SERPL-MCNC: 0.41 MG/DL (ref 0.6–1.3)
EOSINOPHIL # BLD AUTO: 0.09 THOUSAND/ÂΜL (ref 0–0.61)
EOSINOPHIL NFR BLD AUTO: 3 % (ref 0–6)
ERYTHROCYTE [DISTWIDTH] IN BLOOD BY AUTOMATED COUNT: 13 % (ref 11.6–15.1)
EST. AVERAGE GLUCOSE BLD GHB EST-MCNC: 114 MG/DL
GFR SERPL CREATININE-BSD FRML MDRD: 123 ML/MIN/1.73SQ M
GLUCOSE P FAST SERPL-MCNC: 95 MG/DL (ref 65–99)
HBA1C MFR BLD: 5.6 %
HCT VFR BLD AUTO: 35.4 % (ref 34.8–46.1)
HDLC SERPL-MCNC: 59 MG/DL
HGB BLD-MCNC: 12.1 G/DL (ref 11.5–15.4)
IMM GRANULOCYTES # BLD AUTO: 0.03 THOUSAND/UL (ref 0–0.2)
IMM GRANULOCYTES NFR BLD AUTO: 1 % (ref 0–2)
LDLC SERPL CALC-MCNC: 120 MG/DL (ref 0–100)
LYMPHOCYTES # BLD AUTO: 1.19 THOUSANDS/ÂΜL (ref 0.6–4.47)
LYMPHOCYTES NFR BLD AUTO: 33 % (ref 14–44)
MCH RBC QN AUTO: 32 PG (ref 26.8–34.3)
MCHC RBC AUTO-ENTMCNC: 34.2 G/DL (ref 31.4–37.4)
MCV RBC AUTO: 94 FL (ref 82–98)
MONOCYTES # BLD AUTO: 0.44 THOUSAND/ÂΜL (ref 0.17–1.22)
MONOCYTES NFR BLD AUTO: 12 % (ref 4–12)
NEUTROPHILS # BLD AUTO: 1.84 THOUSANDS/ÂΜL (ref 1.85–7.62)
NEUTS SEG NFR BLD AUTO: 50 % (ref 43–75)
NONHDLC SERPL-MCNC: 134 MG/DL
NRBC BLD AUTO-RTO: 0 /100 WBCS
PLATELET # BLD AUTO: 269 THOUSANDS/UL (ref 149–390)
PMV BLD AUTO: 8.7 FL (ref 8.9–12.7)
POTASSIUM SERPL-SCNC: 4.5 MMOL/L (ref 3.5–5.3)
PROT SERPL-MCNC: 6.4 G/DL (ref 6.4–8.4)
RBC # BLD AUTO: 3.78 MILLION/UL (ref 3.81–5.12)
SODIUM SERPL-SCNC: 142 MMOL/L (ref 135–147)
TRIGL SERPL-MCNC: 69 MG/DL (ref ?–150)
WBC # BLD AUTO: 3.61 THOUSAND/UL (ref 4.31–10.16)

## 2025-04-05 PROCEDURE — 36415 COLL VENOUS BLD VENIPUNCTURE: CPT

## 2025-04-05 PROCEDURE — 80053 COMPREHEN METABOLIC PANEL: CPT

## 2025-04-11 ENCOUNTER — OFFICE VISIT (OUTPATIENT)
Age: 50
End: 2025-04-11
Payer: COMMERCIAL

## 2025-04-11 VITALS
HEIGHT: 56 IN | TEMPERATURE: 98.4 F | OXYGEN SATURATION: 99 % | HEART RATE: 83 BPM | BODY MASS INDEX: 34.98 KG/M2 | DIASTOLIC BLOOD PRESSURE: 84 MMHG | SYSTOLIC BLOOD PRESSURE: 114 MMHG | WEIGHT: 155.5 LBS

## 2025-04-11 DIAGNOSIS — E78.5 HYPERLIPIDEMIA, UNSPECIFIED HYPERLIPIDEMIA TYPE: ICD-10-CM

## 2025-04-11 DIAGNOSIS — R11.0 NAUSEA: ICD-10-CM

## 2025-04-11 DIAGNOSIS — K21.9 GASTROESOPHAGEAL REFLUX DISEASE WITHOUT ESOPHAGITIS: ICD-10-CM

## 2025-04-11 DIAGNOSIS — Z00.00 ANNUAL PHYSICAL EXAM: Primary | ICD-10-CM

## 2025-04-11 DIAGNOSIS — R73.01 ELEVATED FASTING GLUCOSE: ICD-10-CM

## 2025-04-11 DIAGNOSIS — I10 HYPERTENSION, UNSPECIFIED TYPE: ICD-10-CM

## 2025-04-11 DIAGNOSIS — E55.9 VITAMIN D DEFICIENCY: ICD-10-CM

## 2025-04-11 DIAGNOSIS — R74.01 ELEVATED AST (SGOT): ICD-10-CM

## 2025-04-11 DIAGNOSIS — D64.9 LOW HEMOGLOBIN: ICD-10-CM

## 2025-04-11 DIAGNOSIS — N87.9 CERVICAL DYSPLASIA: ICD-10-CM

## 2025-04-11 DIAGNOSIS — R73.03 PREDIABETES: ICD-10-CM

## 2025-04-11 DIAGNOSIS — E78.2 MIXED HYPERLIPIDEMIA: ICD-10-CM

## 2025-04-11 DIAGNOSIS — C50.919 MALIGNANT NEOPLASM OF FEMALE BREAST, UNSPECIFIED ESTROGEN RECEPTOR STATUS, UNSPECIFIED LATERALITY, UNSPECIFIED SITE OF BREAST (HCC): ICD-10-CM

## 2025-04-11 DIAGNOSIS — D70.8 OTHER NEUTROPENIA (HCC): ICD-10-CM

## 2025-04-11 DIAGNOSIS — Z13.228 SCREENING FOR METABOLIC DISORDER: ICD-10-CM

## 2025-04-11 PROCEDURE — 99214 OFFICE O/P EST MOD 30 MIN: CPT | Performed by: NURSE PRACTITIONER

## 2025-04-11 PROCEDURE — 99396 PREV VISIT EST AGE 40-64: CPT | Performed by: NURSE PRACTITIONER

## 2025-04-11 RX ORDER — ONDANSETRON 4 MG/1
4 TABLET, FILM COATED ORAL EVERY 8 HOURS PRN
Qty: 20 TABLET | Refills: 0 | Status: SHIPPED | OUTPATIENT
Start: 2025-04-11

## 2025-04-11 NOTE — PATIENT INSTRUCTIONS
"Patient Education     Routine physical for adults   The Basics   Written by the doctors and editors at [facility]   What is a physical? -- A physical is a routine visit, or \"check-up,\" with your doctor. You might also hear it called a \"wellness visit\" or \"preventive visit.\"  During each visit, the doctor will:   Ask about your physical and mental health   Ask about your habits, behaviors, and lifestyle   Do an exam   Give you vaccines if needed   Talk to you about any medicines you take   Give advice about your health   Answer your questions  Getting regular check-ups is an important part of taking care of your health. It can help your doctor find and treat any problems you have. But it's also important for preventing health problems.  A routine physical is different from a \"sick visit.\" A sick visit is when you see a doctor because of a health concern or problem. Since physicals are scheduled ahead of time, you can think about what you want to ask the doctor.  How often should I get a physical? -- It depends on your age and health. In general, for people age 21 years and older:   If you are younger than 50 years, you might be able to get a physical every 3 years.   If you are 50 years or older, your doctor might recommend a physical every year.  If you have an ongoing health condition, like diabetes or high blood pressure, your doctor will probably want to see you more often.  What happens during a physical? -- In general, each visit will include:   Physical exam - The doctor or nurse will check your height, weight, heart rate, and blood pressure. They will also look at your eyes and ears. They will ask about how you are feeling and whether you have any symptoms that bother you.   Medicines - It's a good idea to bring a list of all the medicines you take to each doctor visit. Your doctor will talk to you about your medicines and answer any questions. Tell them if you are having any side effects that bother you. You " "should also tell them if you are having trouble paying for any of your medicines.   Habits and behaviors - This includes:   Your diet   Your exercise habits   Whether you smoke, drink alcohol, or use drugs   Whether you are sexually active   Whether you feel safe at home  Your doctor will talk to you about things you can do to improve your health and lower your risk of health problems. They will also offer help and support. For example, if you want to quit smoking, they can give you advice and might prescribe medicines. If you want to improve your diet or get more physical activity, they can help you with this, too.   Lab tests, if needed - The tests you get will depend on your age and situation. For example, your doctor might want to check your:   Cholesterol   Blood sugar   Iron level   Vaccines - The recommended vaccines will depend on your age, health, and what vaccines you already had. Vaccines are very important because they can prevent certain serious or deadly infections.   Discussion of screening - \"Screening\" means checking for diseases or other health problems before they cause symptoms. Your doctor can recommend screening based on your age, risk, and preferences. This might include tests to check for:   Cancer, such as breast, prostate, cervical, ovarian, colorectal, prostate, lung, or skin cancer   Sexually transmitted infections, such as chlamydia and gonorrhea   Mental health conditions like depression and anxiety  Your doctor will talk to you about the different types of screening tests. They can help you decide which screenings to have. They can also explain what the results might mean.   Answering questions - The physical is a good time to ask the doctor or nurse questions about your health. If needed, they can refer you to other doctors or specialists, too.  Adults older than 65 years often need other care, too. As you get older, your doctor will talk to you about:   How to prevent falling at " home   Hearing or vision tests   Memory testing   How to take your medicines safely   Making sure that you have the help and support you need at home  All topics are updated as new evidence becomes available and our peer review process is complete.  This topic retrieved from [employer] on: May 02, 2024.  Topic 247770 Version 1.0  Release: 32.4.3 - C32.122  © 2024 UpToDate, Inc. and/or its affiliates. All rights reserved.  Consumer Information Use and Disclaimer   Disclaimer: This generalized information is a limited summary of diagnosis, treatment, and/or medication information. It is not meant to be comprehensive and should be used as a tool to help the user understand and/or assess potential diagnostic and treatment options. It does NOT include all information about conditions, treatments, medications, side effects, or risks that may apply to a specific patient. It is not intended to be medical advice or a substitute for the medical advice, diagnosis, or treatment of a health care provider based on the health care provider's examination and assessment of a patient's specific and unique circumstances. Patients must speak with a health care provider for complete information about their health, medical questions, and treatment options, including any risks or benefits regarding use of medications. This information does not endorse any treatments or medications as safe, effective, or approved for treating a specific patient. UpToDate, Inc. and its affiliates disclaim any warranty or liability relating to this information or the use thereof.The use of this information is governed by the Terms of Use, available at https://www.[employer].com/en/know/clinical-effectiveness-terms. 2024© UpToDate, Inc. and its affiliates and/or licensors. All rights reserved.  Copyright   © 2024 UpToDate, Inc. and/or its affiliates. All rights reserved.

## 2025-04-11 NOTE — ASSESSMENT & PLAN NOTE
-Discussed dietary changes and increased exercise  -Continues on Semaglutide through compound pharmacy   -follow up in 4 months or sooner if needed

## 2025-04-11 NOTE — ASSESSMENT & PLAN NOTE
-Continue on omeprazole  - You may use OTC tums as needed.  Recommend small frequent meals throughout the day.  Avoid aggravating foods - spicy foods, acidic foods - such as tomato and citrus.  Avoid alcohol.  Do not lay down for at least 30 mins after eating.

## 2025-04-11 NOTE — ASSESSMENT & PLAN NOTE
Patient is to continue to work on diet and exercise.  Limit sugars and carbohydrate intake.    Avoid soda, juice, sweets, cookies, desserts, pasta, bread.   Eat more whole grains, exercised 30 min of cardio at least 3 times a week.  Also recommended daily foot exams to check for sores, and recommended yearly eye exams.     - She continues on semaglutide from [facility] pharmacy

## 2025-04-11 NOTE — PROGRESS NOTES
Adult Annual Physical  Name: Rosalba Vazquez      : 1975      MRN: [id number]  Encounter Provider: CHINTAN Brambila  Encounter Date: 2025   Encounter department: [facility]    :  Assessment & Plan  Nausea    Orders:    ondansetron (ZOFRAN) 4 mg tablet; Take 1 tablet (4 mg total) by mouth every 8 (eight) hours as needed for nausea or vomiting    Screening for metabolic disorder    Orders:    Comprehensive metabolic panel    CBC and differential    Lipid panel    Elevated fasting glucose    Orders:    Hemoglobin A1C    Hyperlipidemia, unspecified hyperlipidemia type    Orders:    Lipid panel    Annual physical exam         Hypertension, unspecified type  Well controlled on lisinopril.   Continue current regimen -   Continue to monitor blood pressure at home.  Goal BP is < 130/80.  Contact our office for consistent elevations.  Recommend low sodium diet.  Exercise 30 minutes 5 times a week as tolerated.  Recommend yearly eye exam.                  Gastroesophageal reflux disease without esophagitis  -Continue on omeprazole  - You may use OTC tums as needed.  Recommend small frequent meals throughout the day.  Avoid aggravating foods - spicy foods, acidic foods - such as tomato and citrus.  Avoid alcohol.  Do not lay down for at least 30 mins after eating.                Cervical dysplasia  Status post hysterectomy  Following Gyn oncology           Other neutropenia (HCC)  Continue to monitor CBC   Continue to follow with hematology            Low hemoglobin  -CBC unremarkable  -B12 and iron levels sufficient           Malignant neoplasm of female breast, unspecified estrogen receptor status, unspecified laterality, unspecified site of breast (HCC)  -Status post bilateral mastectomy, continue to follow with hematology            Vitamin D deficiency  Continue with supplementation.            Prediabetes  Patient is to continue to work on diet and  exercise.  Limit sugars and carbohydrate intake.    Avoid soda, juice, sweets, cookies, desserts, pasta, bread.   Eat more whole grains, exercised 30 min of cardio at least 3 times a week.  Also recommended daily foot exams to check for sores, and recommended yearly eye exams.     - [facility]           Mixed hyperlipidemia  Currently taking fish oil. Recommend healthy lifestyle choices for your cholesterol.  Low fat/low cholesterol diet.  Limit/avoid red meat.  Eat more lean meat - chicken breast, ground turkey, fish.  Exercise 30 mins at least 5 times a week as tolerated.                 BMI 29.0-29.9,adult  -Discussed dietary changes and increased exercise  -Continues on Semaglutide through compound pharmacy   -follow up in 4 months or sooner if needed                Elevated AST (SGOT)  - Will continue to trend             Preventive Screenings:  - Diabetes Screening: screening up-to-date  - Cholesterol Screening: screening not indicated, has hyperlipidemia and screening up-to-date   - Hepatitis C screening: screening up-to-date   - HIV screening: risks/benefits discussed and patient declines   - Cervical cancer screening: screening not indicated   - Breast cancer screening: has history of breast cancer and screening not indicated   - Colon cancer screening: screening up-to-date   - Lung cancer screening: screening not indicated     Immunizations:  - Immunizations due: Influenza  - Risks/benefits immunizations discussed    - The patient declines recommended vaccines currently despite my recommendations      Counseling/Anticipatory Guidance:  - Alcohol: discussed moderation in alcohol intake and recommendations for healthy alcohol use.   - Drug use: discussed harms of illicit drug use and how it can negatively impact mental/physical health.   - Tobacco use: discussed harms of tobacco use and management options for quitting.   - Dental health: discussed importance of  regular tooth brushing, flossing, and dental visits.   - Sexual health: discussed sexually transmitted diseases, partner selection, use of condoms, avoidance of unintended pregnancy, and contraceptive alternatives.   - Diet: discussed recommendations for a healthy/well-balanced diet.   - Exercise: the importance of regular exercise/physical activity was discussed. Recommend exercise 3-5 times per week for at least 30 minutes.   - Injury prevention: discussed safety/seat belts, safety helmets, smoke detectors, carbon monoxide detectors, and smoking near bedding or upholstery.       Depression Screening and Follow-up Plan: Patient was screened for depression during today's encounter. They screened negative with a PHQ-2 score of 0.          History of Present Illness     Adult Annual Physical:  Patient presents for annual physical. Patient presents today for follow-up.     Patients oncologist suggested that she see her PCP to discuss menopause and weight gain   I did advise her to follow-up with her gynecologist for menopause like symptoms     Has gained weight, she had been unable to obtain GLP-1 through insurance so she went through compound pharmacy, currently taking semaglutide through compounding pharmacy, does report gas and constipation at times   Hemoglobin A1c 5.7-->5.6  Cholesterol improved 193, triglycerides 69, HDL 59,     Goal weight:120-125     AST noted to be elevated at 40     She reports fatigue, ferritin level 22, B12 1207, TSH within normal range to 2.566     She also reports heart burn, takes Prilosec      HTN-well controlled on lisinopril, denies side effects with this medication      Cervical dysplasia-history of abdominal hysterectomy and left salpingo-oophorectomy       Breast CA- history of bilateral mastectomy, taking tamoxifen  Currently taking exemestane (AROMASIN) 25 MG tablet        Mixed hyperlipidemia- currently taking fish oil, well controlled, cholesterol 201 LDL 81     Other  neutropenia- chronic for patient, patient reports that in the past she has been noted to have low white blood cell count, and has seen Hematology in the past     Saw Rheumatology   +YORDAN, dry eyes with negative Sjogren's antibodies and mild leukopenia:  All other labs negative / normal  - no evidence to support diagnosis of systemic autoimmune condition      She reports some seasonal depression- she works from home  and dose not get out much, she defers medication, also offered talk therapy   .     Diet and Physical Activity:  - Diet/Nutrition: portion control, low calorie diet and low carb diet.  - Exercise: moderate cardiovascular exercise, 1-2 times a week on average and 30-60 minutes on average.    Depression Screening:  - PHQ-2 Score: 0    General Health:  - Sleep: sleeps well and 7-8 hours of sleep on average.  - Hearing: normal hearing bilateral ears.  - Vision: wears glasses and most recent eye exam > 1 year ago.  - Dental: regular dental visits, brushes teeth twice daily and floss regularly.    /GYN Health:  - Follows with GYN: yes.   - Menopause: perimenopausal.   - History of STDs: no  - Contraception: hysterectomy and menopause.      Advanced Care Planning:  - Has an advanced directive?: no    - Has a durable medical POA?: no    - ACP document given to patient?: no      Review of Systems   Constitutional:  Negative for activity change, appetite change, chills, diaphoresis and fever.   HENT:  Negative for congestion, ear discharge, ear pain, postnasal drip, rhinorrhea, sinus pressure, sinus pain and sore throat.    Eyes:  Negative for pain, discharge, itching and visual disturbance.   Respiratory:  Negative for cough, chest tightness, shortness of breath and wheezing.    Cardiovascular:  Negative for chest pain, palpitations and leg swelling.   Gastrointestinal:  Negative for abdominal pain, constipation, diarrhea, nausea and vomiting.   Endocrine: Negative for polydipsia, polyphagia and polyuria.  "  Genitourinary:  Negative for difficulty urinating, dysuria and urgency.   Musculoskeletal:  Negative for arthralgias, back pain and neck pain.   Skin:  Negative for rash and wound.   Neurological:  Negative for dizziness, weakness, numbness and headaches.         Objective   /84 (BP Location: Left arm, Patient Position: Sitting, Cuff Size: Standard)   Pulse 83   Temp 98.4 °F (36.9 °C) (Temporal)   Ht 4' 7.98\" (1.422 m)   Wt 70.5 kg (155 lb 8 oz)   SpO2 99%   BMI 34.88 kg/m²     Physical Exam  Constitutional:       General: She is not in acute distress.     Appearance: She is well-developed. She is not diaphoretic.   HENT:      Head: Normocephalic and atraumatic.      Right Ear: External ear normal.      Left Ear: External ear normal.      Nose: Nose normal.      Mouth/Throat:      Mouth: Mucous membranes are moist.      Pharynx: No oropharyngeal exudate or posterior oropharyngeal erythema.   Eyes:      General:         Right eye: No discharge.         Left eye: No discharge.      Conjunctiva/sclera: Conjunctivae normal.      Pupils: Pupils are equal, round, and reactive to light.   Neck:      Thyroid: No thyromegaly.   Cardiovascular:      Rate and Rhythm: Normal rate and regular rhythm.      Heart sounds: Normal heart sounds. No murmur heard.     No friction rub. No gallop.   Pulmonary:      Effort: Pulmonary effort is normal. No respiratory distress.      Breath sounds: Normal breath sounds. No stridor. No wheezing or rales.   Abdominal:      General: Bowel sounds are normal. There is no distension.      Palpations: Abdomen is soft.      Tenderness: There is no abdominal tenderness.   Musculoskeletal:      Cervical back: Normal range of motion and neck supple.   Lymphadenopathy:      Cervical: No cervical adenopathy.   Skin:     General: Skin is warm and dry.      Findings: No erythema or rash.   Neurological:      Mental Status: She is alert and oriented to person, place, and time.   Psychiatric:    "      Behavior: Behavior normal.         Thought Content: Thought content normal.         Judgment: Judgment normal.

## 2025-04-15 ENCOUNTER — PATIENT MESSAGE (OUTPATIENT)
Age: 50
End: 2025-04-15

## 2025-08-02 ENCOUNTER — APPOINTMENT (OUTPATIENT)
Dept: LAB | Facility: CLINIC | Age: 50
End: 2025-08-02
Payer: COMMERCIAL

## 2025-08-02 LAB
ALBUMIN SERPL BCG-MCNC: 4.2 G/DL (ref 3.5–5)
ALP SERPL-CCNC: 63 U/L (ref 34–104)
ALT SERPL W P-5'-P-CCNC: 31 U/L (ref 7–52)
ANION GAP SERPL CALCULATED.3IONS-SCNC: 9 MMOL/L (ref 4–13)
AST SERPL W P-5'-P-CCNC: 27 U/L (ref 13–39)
BASOPHILS # BLD AUTO: 0.02 THOUSANDS/ÂΜL (ref 0–0.1)
BASOPHILS NFR BLD AUTO: 1 % (ref 0–1)
BILIRUB SERPL-MCNC: 0.32 MG/DL (ref 0.2–1)
BUN SERPL-MCNC: 13 MG/DL (ref 5–25)
CALCIUM SERPL-MCNC: 9.8 MG/DL (ref 8.4–10.2)
CHLORIDE SERPL-SCNC: 107 MMOL/L (ref 96–108)
CHOLEST SERPL-MCNC: 226 MG/DL (ref ?–200)
CO2 SERPL-SCNC: 27 MMOL/L (ref 21–32)
CREAT SERPL-MCNC: 0.4 MG/DL (ref 0.6–1.3)
EOSINOPHIL # BLD AUTO: 0.07 THOUSAND/ÂΜL (ref 0–0.61)
EOSINOPHIL NFR BLD AUTO: 2 % (ref 0–6)
ERYTHROCYTE [DISTWIDTH] IN BLOOD BY AUTOMATED COUNT: 13.2 % (ref 11.6–15.1)
EST. AVERAGE GLUCOSE BLD GHB EST-MCNC: 108 MG/DL
GFR SERPL CREATININE-BSD FRML MDRD: 124 ML/MIN/1.73SQ M
GLUCOSE P FAST SERPL-MCNC: 91 MG/DL (ref 65–99)
HBA1C MFR BLD: 5.4 %
HCT VFR BLD AUTO: 37.7 % (ref 34.8–46.1)
HDLC SERPL-MCNC: 100 MG/DL
HGB BLD-MCNC: 12.3 G/DL (ref 11.5–15.4)
IMM GRANULOCYTES # BLD AUTO: 0.01 THOUSAND/UL (ref 0–0.2)
IMM GRANULOCYTES NFR BLD AUTO: 0 % (ref 0–2)
LDLC SERPL CALC-MCNC: 117 MG/DL (ref 0–100)
LYMPHOCYTES # BLD AUTO: 1.51 THOUSANDS/ÂΜL (ref 0.6–4.47)
LYMPHOCYTES NFR BLD AUTO: 36 % (ref 14–44)
MCH RBC QN AUTO: 31.6 PG (ref 26.8–34.3)
MCHC RBC AUTO-ENTMCNC: 32.6 G/DL (ref 31.4–37.4)
MCV RBC AUTO: 97 FL (ref 82–98)
MONOCYTES # BLD AUTO: 0.55 THOUSAND/ÂΜL (ref 0.17–1.22)
MONOCYTES NFR BLD AUTO: 13 % (ref 4–12)
NEUTROPHILS # BLD AUTO: 2.09 THOUSANDS/ÂΜL (ref 1.85–7.62)
NEUTS SEG NFR BLD AUTO: 48 % (ref 43–75)
NONHDLC SERPL-MCNC: 126 MG/DL
NRBC BLD AUTO-RTO: 0 /100 WBCS
PLATELET # BLD AUTO: 278 THOUSANDS/UL (ref 149–390)
PMV BLD AUTO: 9.1 FL (ref 8.9–12.7)
POTASSIUM SERPL-SCNC: 4.1 MMOL/L (ref 3.5–5.3)
PROT SERPL-MCNC: 6.8 G/DL (ref 6.4–8.4)
RBC # BLD AUTO: 3.89 MILLION/UL (ref 3.81–5.12)
SODIUM SERPL-SCNC: 143 MMOL/L (ref 135–147)
TRIGL SERPL-MCNC: 47 MG/DL (ref ?–150)
WBC # BLD AUTO: 4.25 THOUSAND/UL (ref 4.31–10.16)

## 2025-08-11 ENCOUNTER — OFFICE VISIT (OUTPATIENT)
Age: 50
End: 2025-08-11
Payer: COMMERCIAL

## 2025-08-11 PROBLEM — R73.03 PREDIABETES: Status: RESOLVED | Noted: 2024-12-06 | Resolved: 2025-08-11

## 2025-08-11 PROBLEM — R74.01 ELEVATED AST (SGOT): Status: RESOLVED | Noted: 2025-04-11 | Resolved: 2025-08-11
